# Patient Record
Sex: FEMALE | Race: NATIVE HAWAIIAN OR OTHER PACIFIC ISLANDER | NOT HISPANIC OR LATINO | Employment: FULL TIME | URBAN - METROPOLITAN AREA
[De-identification: names, ages, dates, MRNs, and addresses within clinical notes are randomized per-mention and may not be internally consistent; named-entity substitution may affect disease eponyms.]

---

## 2017-04-25 ENCOUNTER — APPOINTMENT (OUTPATIENT)
Dept: RADIOLOGY | Facility: MEDICAL CENTER | Age: 47
End: 2017-04-25
Attending: OBSTETRICS & GYNECOLOGY
Payer: COMMERCIAL

## 2017-07-02 ENCOUNTER — OFFICE VISIT (OUTPATIENT)
Dept: URGENT CARE | Facility: CLINIC | Age: 47
End: 2017-07-02
Payer: COMMERCIAL

## 2017-07-02 VITALS
RESPIRATION RATE: 18 BRPM | OXYGEN SATURATION: 98 % | BODY MASS INDEX: 28.24 KG/M2 | HEART RATE: 94 BPM | HEIGHT: 63 IN | SYSTOLIC BLOOD PRESSURE: 120 MMHG | DIASTOLIC BLOOD PRESSURE: 70 MMHG | WEIGHT: 159.4 LBS | TEMPERATURE: 98.1 F
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
## 2017-07-02 DIAGNOSIS — S86.811A STRAIN OF CALF MUSCLE, RIGHT, INITIAL ENCOUNTER: ICD-10-CM

## 2017-07-02 PROCEDURE — 99213 OFFICE O/P EST LOW 20 MIN: CPT | Performed by: PHYSICIAN ASSISTANT

## 2017-07-02 RX ORDER — HYDROCODONE BITARTRATE AND ACETAMINOPHEN 5; 325 MG/1; MG/1
1 TABLET ORAL EVERY 6 HOURS PRN
Qty: 14 TAB | Refills: 0 | Status: SHIPPED | OUTPATIENT
Start: 2017-07-02 | End: 2018-06-28

## 2017-07-02 ASSESSMENT — ENCOUNTER SYMPTOMS
NUMBNESS: 0
LOSS OF MOTION: 0
TINGLING: 0
MUSCLE WEAKNESS: 1
LOSS OF SENSATION: 0
MUSCULOSKELETAL NEGATIVE: 1
CONSTITUTIONAL NEGATIVE: 1
NEUROLOGICAL NEGATIVE: 1
INABILITY TO BEAR WEIGHT: 0

## 2017-07-02 NOTE — PROGRESS NOTES
"Subjective:      Shanae Burr is a 46 y.o. female who presents with Leg Injury            Leg Injury   The incident occurred 5 to 7 days ago (fall last wk; calf stain/inj). The incident occurred at home. The injury mechanism was a fall. The pain is present in the left leg. The quality of the pain is described as aching. The pain is moderate. The pain has been constant since onset. Associated symptoms include muscle weakness. Pertinent negatives include no inability to bear weight, loss of motion, loss of sensation, numbness or tingling. She reports no foreign bodies present. The symptoms are aggravated by movement, palpation and weight bearing. She has tried rest for the symptoms. The treatment provided mild relief.       Review of Systems   Constitutional: Negative.    Musculoskeletal: Negative.    Skin: Negative.    Neurological: Negative.  Negative for tingling and numbness.          Objective:     /70 mmHg  Pulse 94  Temp(Src) 36.7 °C (98.1 °F)  Resp 18  Ht 1.6 m (5' 3\")  Wt 72.303 kg (159 lb 6.4 oz)  BMI 28.24 kg/m2  SpO2 98%     Physical Exam   Constitutional: She is oriented to person, place, and time. She appears well-developed and well-nourished. No distress.   Musculoskeletal: Normal range of motion. She exhibits tenderness. She exhibits no edema.   Neurological: She is alert and oriented to person, place, and time. No sensory deficit. She exhibits abnormal muscle tone. Coordination and gait normal.   Skin: Skin is warm and dry. No erythema.   Psychiatric: She has a normal mood and affect. Her behavior is normal. Judgment and thought content normal.   Nursing note and vitals reviewed.    Filed Vitals:    07/02/17 1323   BP: 120/70   Pulse: 94   Temp: 36.7 °C (98.1 °F)   Resp: 18   Height: 1.6 m (5' 3\")   Weight: 72.303 kg (159 lb 6.4 oz)   SpO2: 98%     Active Ambulatory Problems     Diagnosis Date Noted   • No Active Ambulatory Problems     Resolved Ambulatory Problems     Diagnosis " Date Noted   • No Resolved Ambulatory Problems     No Additional Past Medical History     No current outpatient prescriptions on file prior to visit.     No current facility-administered medications on file prior to visit.     Gargles, Cepacol lozenges, Aleve/Advil as needed for throat pain  History reviewed. No pertinent family history.  Review of patient's allergies indicates no known allergies.              Assessment/Plan:     ·  fall x 1wk; L calf inj      · R/o surgical tear of upper calf/popliteal area; may need MRI [or U/S?]; will refer to Sports Med  · ACE applied

## 2017-07-02 NOTE — MR AVS SNAPSHOT
"        Shanae Burr   2017 1:15 PM   Office Visit   MRN: [id number]    Department:  [facility]   Dept Phone:  [phone]    Description:  Female : 1970   Provider:  Remy Perez PA-C           Reason for Visit     Leg Injury x1week, right leg injury after falling off ladder, landed on leg, calf pain, pain starting to travel up leg behind knee      Allergies as of 2017     No Known Allergies      You were diagnosed with     Fall, initial encounter   [[id number]]       Strain of calf muscle, right, initial encounter   [[id number]]         Vital Signs     Blood Pressure Pulse Temperature Respirations Height Weight    120/70 mmHg 94 36.7 °C (98.1 °F) 18 1.6 m (5' 3\") 72.303 kg (159 lb 6.4 oz)    Body Mass Index Oxygen Saturation                28.24 kg/m2 98%          Basic Information     Date Of Birth Sex Race Ethnicity Preferred Language    1970 Female ,  or other  Non- English      Your appointments     2017  1:00 PM   NEW TO YOU with Surjit Dempsey M.D.   [facility]    [address] [place] [id number] [phone]              Health Maintenance        Date Due Completion Dates    IMM DTaP/Tdap/Td Vaccine (1 - Tdap) 1990 ---    PAP SMEAR 1992 ---    MAMMOGRAM 2015, 2013, 2012, 2011    IMM INFLUENZA (1) 2017 ---            Current Immunizations     No immunizations on file.      Below and/or attached are the medications your provider expects you to take. Review all of your home medications and newly ordered medications with your provider and/or pharmacist. Follow medication instructions as directed by your provider and/or pharmacist. Please keep your medication list with you and share with your provider. Update the information when medications are discontinued, doses are changed, or new medications (including over-the-counter products) " are added; and carry medication information at all times in the event of emergency situations     Allergies:  No Known Allergies          Medications  Valid as of: July 02, 2017 -  1:42 PM    Generic Name Brand Name Tablet Size Instructions for use    Hydrocodone-Acetaminophen (Tab) NORCO 5-325 MG Take 1 Tab by mouth every 6 hours as needed.        NON SPECIFIED   Indications: birth control        .                 Medicines prescribed today were sent to:     [facility]Y #[id number] - [place] - [address] [place] [address]    Phone: [phone] Fax: [phone]    Open 24 Hours?: No      Medication refill instructions:       If your prescription bottle indicates you have medication refills left, it is not necessary to call your provider’s office. Please contact your pharmacy and they will refill your medication.    If your prescription bottle indicates you do not have any refills left, you may request refills at any time through one of the following ways: The online [employer] system (except Urgent Care), by calling your provider’s office, or by asking your pharmacy to contact your provider’s office with a refill request. Medication refills are processed only during regular business hours and may not be available until the next business day. Your provider may request additional information or to have a follow-up visit with you prior to refilling your medication.   *Please Note: Medication refills are assigned a new Rx number when refilled electronically. Your pharmacy may indicate that no refills were authorized even though a new prescription for the same medication is available at the pharmacy. Please request the medicine by name with the pharmacy before contacting your provider for a refill.        Referral     A referral request has been sent to our patient care coordination department. Please allow 3-5 business days for us to process this request and contact you either by phone or mail. If you do not  hear from us by the 5th business day, please call us at [phone].           [employer] Access Code: Activation code not generated  Current [employer] Status: Active

## 2017-07-03 ENCOUNTER — OFFICE VISIT (OUTPATIENT)
Dept: MEDICAL GROUP | Facility: CLINIC | Age: 47
End: 2017-07-03
Payer: COMMERCIAL

## 2017-07-03 VITALS
DIASTOLIC BLOOD PRESSURE: 80 MMHG | SYSTOLIC BLOOD PRESSURE: 114 MMHG | TEMPERATURE: 98.7 F | BODY MASS INDEX: 28.17 KG/M2 | OXYGEN SATURATION: 98 % | HEIGHT: 63 IN | WEIGHT: 159 LBS

## 2017-07-03 DIAGNOSIS — S86.111A GASTROCNEMIUS MUSCLE TEAR, RIGHT, INITIAL ENCOUNTER: ICD-10-CM

## 2017-07-03 PROCEDURE — 99203 OFFICE O/P NEW LOW 30 MIN: CPT | Performed by: FAMILY MEDICINE

## 2017-07-03 ASSESSMENT — ENCOUNTER SYMPTOMS
DIZZINESS: 0
SHORTNESS OF BREATH: 0
NAUSEA: 0
FEVER: 0
CHILLS: 0
VOMITING: 0

## 2017-07-03 NOTE — PROGRESS NOTES
"Subjective:      Shanae Burr is a 46 y.o. female who presents with Leg Injury      Referred by Remy Perez PA-C for evaluation of right leg pain    Leg Injury   Incident onset: DOI, Sunday, 6/25.   fell off step-stool and felt pop right calf muscle pain  Constant crampy  No radiation  Medial gastroc region  Some swelling for a few days  Improved with ice and ibuprofen  No prior injury to the leg    Review of Systems   Constitutional: Negative for fever and chills.   Respiratory: Negative for shortness of breath.    Cardiovascular: Negative for chest pain.   Gastrointestinal: Negative for nausea and vomiting.   Neurological: Negative for dizziness.     PMH:  has no past medical history of Breast cancer (CMS-[facility]).  MEDS:   Current outpatient prescriptions:   •  NON SPECIFIED, Indications: birth control, Disp: , Rfl:   •  hydrocodone-acetaminophen (NORCO) 5-325 MG Tab per tablet, Take 1 Tab by mouth every 6 hours as needed., Disp: 14 Tab, Rfl: 0  ALLERGIES: No Known Allergies  SURGHX: History reviewed. No pertinent past surgical history.  SOCHX:  reports that she has never smoked. She has never used smokeless tobacco. She reports that she drinks alcohol. She reports that she does not use illicit drugs.  FH: Family history was reviewed, no pertinent findings to report       Objective:     /80 mmHg  Temp(Src) 37.1 °C (98.7 °F)  Ht 1.6 m (5' 3\")  Wt 72.122 kg (159 lb)  BMI 28.17 kg/m2  SpO2 98%     Physical Exam   Constitutional: She appears well-developed. No distress.   Pulmonary/Chest: Effort normal.   Skin: Skin is warm and dry. She is not diaphoretic. No erythema.   Psychiatric: She has a normal mood and affect. Her behavior is normal.        lower extremities  Right lower extremity swelling compared to the left  POSITIVE medial head of the gastrocnemius on the right compared to the left  There is no tenderness at the lateral gastrocnemius bilaterally  15 cm from inferior patellar pole  Right 43 " CM   Left 42 CM  The legs are otherwise neurovascularly intact   There is POSITIVE pain with passive dorsiflexion of the foot on the right compared to the left with the knee in extension  There is NO pain with passive dorsiflexion of the foot bilaterally with the knee in flexion  Assessment/Plan:     1. Gastrocnemius muscle tear, right, initial encounter       Proximal muscle tear of the medial gastrocnemius on the right  Cam BOOT  F/u  7/14/17  Expect this to take 8-12 weeks to heal, in the meantime, activities as tolerated in the boot   She is still having pain in the boot she should decrease her activity level    Thank you Remy Perez PA-C for allowing me to participate in caring for the patient.

## 2017-07-03 NOTE — MR AVS SNAPSHOT
"Shanae Burr   7/3/2017 1:00 PM   Office Visit   MRN: [id number]    Department:  [facility]   Dept Phone:  [phone]    Description:  Female : 1970   Provider:  Surjit Dempsey M.D.           Reason for Visit     Leg Injury poss pulled muscle R side of calf, fell off ladder      Allergies as of 7/3/2017     No Known Allergies      You were diagnosed with     Gastrocnemius muscle tear, right, initial encounter   [[id number]]         Vital Signs     Blood Pressure Temperature Height Weight Body Mass Index Oxygen Saturation    114/80 mmHg 37.1 °C (98.7 °F) 1.6 m (5' 3\") 72.122 kg (159 lb) 28.17 kg/m2 98%    Smoking Status                   Never Smoker            Basic Information     Date Of Birth Sex Race Ethnicity Preferred Language    1970 Female ,  or other  Non- English      Your appointments     2017  1:00 PM   Established Patient with Surjit Dempsey M.D.   [facility]    [address] [facility] [id number] [phone]           You will be receiving a confirmation call a few days before your appointment from our automated call confirmation system.              Health Maintenance        Date Due Completion Dates    IMM DTaP/Tdap/Td Vaccine (1 - Tdap) 1990 ---    PAP SMEAR 1992 ---    MAMMOGRAM 2015, 2013, 2012, 2011    IMM INFLUENZA (1) 2017 ---            Current Immunizations     No immunizations on file.      Below and/or attached are the medications your provider expects you to take. Review all of your home medications and newly ordered medications with your provider and/or pharmacist. Follow medication instructions as directed by your provider and/or pharmacist. Please keep your medication list with you and share with your provider. Update the information when medications are discontinued, doses are changed, or new medications " (including over-the-counter products) are added; and carry medication information at all times in the event of emergency situations     Allergies:  No Known Allergies          Medications  Valid as of: July 03, 2017 -  2:01 PM    Generic Name Brand Name Tablet Size Instructions for use    Hydrocodone-Acetaminophen (Tab) NORCO 5-325 MG Take 1 Tab by mouth every 6 hours as needed.        NON SPECIFIED   Indications: birth control        .                 Medicines prescribed today were sent to:     [facility]/PHARMACY #[id number] - [place] - [address] [place] [address]    Phone: [phone] Fax: [phone]    Open 24 Hours?: No      Medication refill instructions:       If your prescription bottle indicates you have medication refills left, it is not necessary to call your provider’s office. Please contact your pharmacy and they will refill your medication.    If your prescription bottle indicates you do not have any refills left, you may request refills at any time through one of the following ways: The online [employer] system (except Urgent Care), by calling your provider’s office, or by asking your pharmacy to contact your provider’s office with a refill request. Medication refills are processed only during regular business hours and may not be available until the next business day. Your provider may request additional information or to have a follow-up visit with you prior to refilling your medication.   *Please Note: Medication refills are assigned a new Rx number when refilled electronically. Your pharmacy may indicate that no refills were authorized even though a new prescription for the same medication is available at the pharmacy. Please request the medicine by name with the pharmacy before contacting your provider for a refill.           [employer] Access Code: Activation code not generated  Current [employer] Status: Active

## 2017-07-14 ENCOUNTER — OFFICE VISIT (OUTPATIENT)
Dept: MEDICAL GROUP | Facility: CLINIC | Age: 47
End: 2017-07-14
Payer: COMMERCIAL

## 2017-07-14 VITALS
TEMPERATURE: 98.1 F | HEART RATE: 80 BPM | DIASTOLIC BLOOD PRESSURE: 76 MMHG | OXYGEN SATURATION: 97 % | SYSTOLIC BLOOD PRESSURE: 112 MMHG | RESPIRATION RATE: 18 BRPM | WEIGHT: 159 LBS | HEIGHT: 63 IN | BODY MASS INDEX: 28.17 KG/M2

## 2017-07-14 DIAGNOSIS — S86.111A GASTROCNEMIUS MUSCLE TEAR, RIGHT, INITIAL ENCOUNTER: ICD-10-CM

## 2017-07-14 PROCEDURE — 99213 OFFICE O/P EST LOW 20 MIN: CPT | Performed by: FAMILY MEDICINE

## 2017-07-14 NOTE — PROGRESS NOTES
"Subjective:      Shanae Burr is a 46 y.o. female who presents with Follow-Up      Referred by Remy Perez PA-C for evaluation of right leg pain  Incident onset: DOI, Sunday, 6/25.    Leg Injury   follow up for leg injury  fell off step-stool and felt pop right calf muscle pain  Improved, pain mostly with activity now  STOPPED using her cam walker 3-4 days ago (about 7/11/17)  No radiation  Medial gastroc region  No swelling   No prior leg issue    Review of Systems   Constitutional: Negative for fever and chills.   Respiratory: Negative for shortness of breath.    Cardiovascular: Negative for chest pain.   Gastrointestinal: Negative for nausea and vomiting.   Neurological: Negative for dizziness.     PMH:  has no past medical history of Breast cancer (CMS-[facility]).  MEDS:   Current outpatient prescriptions:   •  NON SPECIFIED, Indications: birth control, Disp: , Rfl:   •  hydrocodone-acetaminophen (NORCO) 5-325 MG Tab per tablet, Take 1 Tab by mouth every 6 hours as needed., Disp: 14 Tab, Rfl: 0  ALLERGIES: No Known Allergies  SURGHX: No past surgical history on file.  SOCHX:  reports that she has never smoked. She has never used smokeless tobacco. She reports that she drinks alcohol. She reports that she does not use illicit drugs.  FH: Family history was reviewed, no pertinent findings to report       Objective:     /76 mmHg  Pulse 80  Temp(Src) 36.7 °C (98.1 °F)  Resp 18  Ht 1.6 m (5' 3\")  Wt 72.122 kg (159 lb)  BMI 28.17 kg/m2  SpO2 97%     Physical Exam   Constitutional: She appears well-developed. No distress.   Pulmonary/Chest: Effort normal.   Skin: Skin is warm and dry. She is not diaphoretic. No erythema.   Psychiatric: She has a normal mood and affect. Her behavior is normal.        lower extremities  Right lower extremity swelling compared to the left  POSITIVE medial head of the gastrocnemius on the right compared to the left  There is no tenderness at the lateral gastrocnemius " bilaterally  The legs are otherwise neurovascularly intact   There is POSITIVE pain with passive dorsiflexion of the foot on the right compared to the left with the knee in extension  There is NO pain with passive dorsiflexion of the foot bilaterally with the knee in flexion  Assessment/Plan:     1. Gastrocnemius muscle tear, right, initial encounter        Proximal muscle tear of the medial gastrocnemius on the right  Stopped using Cam BOOT despite recommendation  Cleared for swimming without kicking of wall    Return in about 2 weeks (around 7/28/2017).    Expect this to take 8-12 weeks to heal  If she is having pain she should decrease her activity level    Thank you Remy Perez PA-C for allowing me to participate in caring for the patient.

## 2017-07-14 NOTE — MR AVS SNAPSHOT
"Shanae Burr   2017 1:00 PM   Office Visit   MRN: [id number]    Department:  [facility]   Dept Phone:  [phone]    Description:  Female : 1970   Provider:  Surjit Dempsey M.D.           Reason for Visit     Follow-Up F/U R side muscle tear       Allergies as of 2017     No Known Allergies      You were diagnosed with     Gastrocnemius muscle tear, right, initial encounter   [[id number]]         Vital Signs     Blood Pressure Pulse Temperature Respirations Height Weight    112/76 mmHg 80 36.7 °C (98.1 °F) 18 1.6 m (5' 3\") 72.122 kg (159 lb)    Body Mass Index Oxygen Saturation Smoking Status             28.17 kg/m2 97% Never Smoker          Basic Information     Date Of Birth Sex Race Ethnicity Preferred Language    1970 Female ,  or other  Non- English      Health Maintenance        Date Due Completion Dates    IMM DTaP/Tdap/Td Vaccine (1 - Tdap) 1990 ---    PAP SMEAR 1992 ---    MAMMOGRAM 2015, 2013, 2012, 2011    IMM INFLUENZA (1) 2017 ---            Current Immunizations     No immunizations on file.      Below and/or attached are the medications your provider expects you to take. Review all of your home medications and newly ordered medications with your provider and/or pharmacist. Follow medication instructions as directed by your provider and/or pharmacist. Please keep your medication list with you and share with your provider. Update the information when medications are discontinued, doses are changed, or new medications (including over-the-counter products) are added; and carry medication information at all times in the event of emergency situations     Allergies:  No Known Allergies          Medications  Valid as of: 2017 -  1:29 PM    Generic Name Brand Name Tablet Size Instructions for use    Hydrocodone-Acetaminophen (Tab) NORCO 5-325 MG Take 1 Tab by mouth " every 6 hours as needed.        NON SPECIFIED   Indications: birth control        .                 Medicines prescribed today were sent to:     [facility]/PHARMACY #[id number] - [place] - [address] [place] [address]    Phone: [phone] Fax: [phone]    Open 24 Hours?: No      Medication refill instructions:       If your prescription bottle indicates you have medication refills left, it is not necessary to call your provider’s office. Please contact your pharmacy and they will refill your medication.    If your prescription bottle indicates you do not have any refills left, you may request refills at any time through one of the following ways: The online [employer] system (except Urgent Care), by calling your provider’s office, or by asking your pharmacy to contact your provider’s office with a refill request. Medication refills are processed only during regular business hours and may not be available until the next business day. Your provider may request additional information or to have a follow-up visit with you prior to refilling your medication.   *Please Note: Medication refills are assigned a new Rx number when refilled electronically. Your pharmacy may indicate that no refills were authorized even though a new prescription for the same medication is available at the pharmacy. Please request the medicine by name with the pharmacy before contacting your provider for a refill.           [employer] Access Code: Activation code not generated  Current [employer] Status: Active

## 2017-12-22 ENCOUNTER — HOSPITAL ENCOUNTER (OUTPATIENT)
Dept: RADIOLOGY | Facility: MEDICAL CENTER | Age: 47
End: 2017-12-22
Attending: OBSTETRICS & GYNECOLOGY
Payer: COMMERCIAL

## 2017-12-22 DIAGNOSIS — Z13.9 SCREENING: ICD-10-CM

## 2017-12-22 PROCEDURE — G0202 SCR MAMMO BI INCL CAD: HCPCS

## 2018-06-28 ENCOUNTER — OFFICE VISIT (OUTPATIENT)
Dept: MEDICAL GROUP | Facility: PHYSICIAN GROUP | Age: 48
End: 2018-06-28
Payer: COMMERCIAL

## 2018-06-28 VITALS
SYSTOLIC BLOOD PRESSURE: 108 MMHG | HEIGHT: 63 IN | DIASTOLIC BLOOD PRESSURE: 62 MMHG | BODY MASS INDEX: 30.72 KG/M2 | WEIGHT: 173.4 LBS | OXYGEN SATURATION: 97 % | RESPIRATION RATE: 16 BRPM | TEMPERATURE: 98 F | HEART RATE: 64 BPM

## 2018-06-28 DIAGNOSIS — E66.9 OBESITY (BMI 30-39.9): ICD-10-CM

## 2018-06-28 DIAGNOSIS — Z23 NEED FOR VACCINATION: ICD-10-CM

## 2018-06-28 DIAGNOSIS — M77.12 LATERAL EPICONDYLITIS OF LEFT ELBOW: ICD-10-CM

## 2018-06-28 DIAGNOSIS — G43.009 MIGRAINE WITHOUT AURA AND WITHOUT STATUS MIGRAINOSUS, NOT INTRACTABLE: ICD-10-CM

## 2018-06-28 PROBLEM — M77.9 TENDONITIS: Status: ACTIVE | Noted: 2018-06-28

## 2018-06-28 PROCEDURE — 90471 IMMUNIZATION ADMIN: CPT | Performed by: PHYSICIAN ASSISTANT

## 2018-06-28 PROCEDURE — 90715 TDAP VACCINE 7 YRS/> IM: CPT | Performed by: PHYSICIAN ASSISTANT

## 2018-06-28 PROCEDURE — 99214 OFFICE O/P EST MOD 30 MIN: CPT | Mod: 25 | Performed by: PHYSICIAN ASSISTANT

## 2018-06-28 RX ORDER — SUMATRIPTAN 100 MG/1
TABLET, FILM COATED ORAL
Refills: 11 | COMMUNITY
Start: 2018-06-04 | End: 2023-11-10 | Stop reason: SDUPTHER

## 2018-06-28 RX ORDER — LEVONORGESTREL AND ETHINYL ESTRADIOL 100-20(84)
1 KIT ORAL DAILY
Refills: 4 | COMMUNITY
Start: 2018-06-04 | End: 2022-09-06

## 2018-06-28 RX ORDER — MELOXICAM 7.5 MG/1
7.5 TABLET ORAL DAILY
Qty: 30 TAB | Refills: 0 | Status: SHIPPED | OUTPATIENT
Start: 2018-06-28 | End: 2018-07-26 | Stop reason: SDUPTHER

## 2018-06-28 ASSESSMENT — PATIENT HEALTH QUESTIONNAIRE - PHQ9: CLINICAL INTERPRETATION OF PHQ2 SCORE: 0

## 2018-06-28 ASSESSMENT — PAIN SCALES - GENERAL: PAINLEVEL: NO PAIN

## 2018-06-28 NOTE — ASSESSMENT & PLAN NOTE
Chronic but stable condition. Patient states she was diagnosed with migraines 20 years ago. States she is currently prescribed Imitrex 100 mg tablet and advised to take one tablet onset of headache symptoms. Repeat dose 2 hours later if symptoms have not subsided. It does not exceed more than 200 mg in a 24-hour span. States she is compliant with medication and experiences no side effects or complications from medication. States she also takes over-the-counter ibuprofen with mild alleviation of symptoms. She mentions that he is a trigger for migraines. Denies photosensitivity, photophobia, nausea or vomiting.

## 2018-06-28 NOTE — PROGRESS NOTES
Chief Complaint   Patient presents with   • Establish Care     LT arm px, pt states px is not due to injury       HISTORY OF PRESENT ILLNESS: Shanae Burr is an established 47 y.o. female here to discuss the evaluation and management of:    Obesity (BMI 30-39.9)  Pt. Admits diet could improve. States exercise routine consist of cardiovascular and strength training 3-4 times a week ~2 hours per time.     Lateral epicondylitis of left elbow  Acute. Patient states for the past one month lateral epicondyle has been tender to deep palpation. States she's also been experiencing pain of lateral forearm and lateral upper arm. Pain is an intermittent radiating low-grade aching pain. Lifting heavier objects aggravate symptoms. Rest alleviates symptoms. States she has been taking over-the-counter ibuprofen with temporary mild alleviation of symptoms. States she has also been using icy hot with mild alleviation of symptoms. Denies muscle atrophy or weakness.      Migraine without aura and without status migrainosus, not intractable  Chronic but stable condition. Patient states she was diagnosed with migraines 20 years ago. States she is currently prescribed Imitrex 100 mg tablet and advised to take one tablet onset of headache symptoms. Repeat dose 2 hours later if symptoms have not subsided. It does not exceed more than 200 mg in a 24-hour span. States she is compliant with medication and experiences no side effects or complications from medication. States she also takes over-the-counter ibuprofen with mild alleviation of symptoms. She mentions that he is a trigger for migraines. Denies photosensitivity, photophobia, nausea or vomiting.        Patient Active Problem List    Diagnosis Date Noted   • Obesity (BMI 30-39.9) 06/28/2018   • Lateral epicondylitis of left elbow 06/28/2018   • Migraine without aura and without status migrainosus, not intractable 06/28/2018       Allergies:Patient has no known  allergies.    Current Outpatient Prescriptions   Medication Sig Dispense Refill   • meloxicam (MOBIC) 7.5 MG Tab Take 1 Tab by mouth every day. 30 Tab 0   • Levonorgest-Eth Estrad -Day 0.1-0.02 & 0.01 MG Tab Take 1 tablet by mouth every day. TAKE 1 TABLET BY MOUTH DAILY  4   • sumatriptan (IMITREX) 100 MG tablet TAKE 1 TABLET BY MOUTH AT ONSET OF HEADACHE, THEN TAKE AS NEEDED. MAX 1 TAB PER 24 HOURS  11     No current facility-administered medications for this visit.        Social History   Substance Use Topics   • Smoking status: Never Smoker   • Smokeless tobacco: Never Used   • Alcohol use Yes      Comment: Rarely. No Binge-Drinking.         Family Status   Relation Status   • Mother Alive   • Father Alive   • Sister Alive   • Brother Alive   • Maternal Grandmother    • Maternal Grandfather    • Paternal Grandmother    • Paternal Grandfather    • Daughter Alive   • Daughter Alive     Family History   Problem Relation Age of Onset   • No Known Problems Mother    • No Known Problems Father    • Diabetes Maternal Grandfather      Type II   • Heart Disease Maternal Grandfather    • No Known Problems Daughter    • No Known Problems Daughter        ROS:  Review of Systems   Constitutional: Negative for fever, chills, weight loss and malaise/fatigue.   HENT: Negative for ear pain, nosebleeds, congestion, sore throat and neck pain.    Eyes: Negative for blurred vision.   Respiratory: Negative for cough, sputum production, shortness of breath and wheezing.    Cardiovascular: Negative for chest pain, palpitations, orthopnea and leg swelling.   Gastrointestinal: Negative for heartburn, nausea, vomiting and abdominal pain.   Genitourinary: Negative for dysuria, urgency and frequency.   Musculoskeletal: Negative for myalgias, back pain and joint pain.  Positive for left arm pain.  Skin: Negative for rash and itching.   Neurological: Negative for dizziness, tingling, tremors, sensory change,  "focal weakness and headaches.   Endo/Heme/Allergies: Does not bruise/bleed easily.   Psychiatric/Behavioral: Negative for depression, suicidal ideas and memory loss.  The patient is not nervous/anxious and does not have insomnia.    All other systems reviewed and are negative except as in HPI.    Exam: Blood pressure 108/62, pulse 64, temperature 36.7 °C (98 °F), resp. rate 16, height 1.6 m (5' 3\"), weight 78.7 kg (173 lb 6.4 oz), SpO2 97 %, not currently breastfeeding. Body mass index is 30.72 kg/m².  General: Normal appearing. No distress.  HEENT: Normocephalic. Eyes conjunctiva clear lids without ptosis, pupils equal and reactive to light accommodation, ears normal shape and contour, canals are clear bilaterally, tympanic membranes are benign, nasal mucosa benign, oropharynx is without erythema, edema or exudates.   Neck: Supple without JVD or bruit. Thyroid is not enlarged.  Pulmonary: Clear to ausculation.  Normal effort. No rales, ronchi, or wheezing.  Cardiovascular: Regular rate and rhythm without murmur. Carotid pulses are intact and equal bilaterally.  Abdomen: Soft, nontender, nondistended. Normal bowel sounds. Liver and spleen are not palpable  Neurologic: Grossly nonfocal.  Cranial nerves are normal. LE TR's normal and symmetric.  Lymph: No cervical, supraclavicular or axillary lymph nodes are palpable  Skin: Warm and dry.  No rashes or suspicious skin lesions.  Musculoskeletal: Normal gait. No extremity cyanosis, clubbing, or edema. Positive for  tenderness in the left lateral epicondyle and proximal wrist extensor muscles. Positive for pain with resisted wrist extension with elbow in full extension. Positive for pain with wrist flexion with elbow in full extension. Positive for left decreased  strength. 4/5. Negative for muscle atrophy.  Psych: Normal mood and affect. Alert and oriented x3. Judgment and insight is normal.    Medical decision-making and discussion:  1. Obesity (BMI 30-39.9)  - " Encouraged diet high in fruits, vegetables, and fiber. And a diet low in salt, refined carbohydrates, cholesterol, saturated fat, and trans fatty acids.    - Encouraged  a minimum of 30 minutes of moderate intensity aerobic exercise (eg, brisk walking) is recommended on five days each week. Or 20 minutes of vigorous-intensity aerobic exercise (eg, jogging) on three days each week.     - Patient identified as having weight management issue.  Appropriate orders and counseling given.    2. Lateral epicondylitis of left elbow  Advised patient to rest, stretch, use anti-inflammatories as prescribed, suggested heating pad or icing as needed. Also suggested elbow brace and over-the-counter Arinca Gel.  Patient has been prescribed Mobic 7.5 mg tablets take one tablet by mouth every day with food. Discussed common side effects and adverse reactions of medication with patient. Advised patient if she develops gastrointestinal symptoms to discontinue medication.     Symptoms are not improving discussed referral to sports medicine for further evaluation symptoms.    Follow-up for worsening symptoms,lack of expected recovery, or should new symptoms or problems arise.      - meloxicam (MOBIC) 7.5 MG Tab; Take 1 Tab by mouth every day.  Dispense: 30 Tab; Refill: 0    3. Migraine without aura and without status migrainosus, not intractable  Chronic stable condition. Continue current medication regimen. Will continue to monitor.    4. Need for vaccination  A TDAP vaccination was administered to patient without complication. A VIS form was provided to patient.     - Tdap =>8yo IM      Please note that this dictation was created using voice recognition software. I have made every reasonable attempt to correct obvious errors, but I expect that there are errors of grammar and possibly content that I did not discover before finalizing the note.        Return if symptoms worsen or fail to improve.

## 2018-06-28 NOTE — LETTER
[facility]  Jayda Galan P.A.-C.  [address]  [place] [address]  Fax: [phone]   Authorization for Release/Disclosure of   Protected Health Information   Name: SHANAE BHATT : 1970 SSN: [id number]   Address: [address] [address]  [place] [address] Phone:    [phone] (home)    I authorize the entity listed below to release/disclose the PHI below to:   [facility]/Jayda Galan P.A.-C. and Jayda Galan P.A.-C.   Provider or Entity Name:  [facility]   Address   City, State, [facility]   Phone:      Fax:     Reason for request: continuity of care   Information to be released:    [  ] LAST COLONOSCOPY,  including any PATH REPORT and follow-up  [  ] LAST FIT/COLOGUARD RESULT [  ] LAST DEXA  [  ] LAST MAMMOGRAM  [ x ] LAST PAP  [  ] LAST LABS [  ] RETINA EXAM REPORT  [  ] IMMUNIZATION RECORDS  [  ] Release all info      [  ] Check here and initial the line next to each item to release ALL health information INCLUDING  _____ Care and treatment for drug and / or alcohol abuse  _____ HIV testing, infection status, or AIDS  _____ Genetic Testing    DATES OF SERVICE OR TIME PERIOD TO BE DISCLOSED: _____________  I understand and acknowledge that:  * This Authorization may be revoked at any time by you in writing, except if your health information has already been used or disclosed.  * Your health information that will be used or disclosed as a result of you signing this authorization could be re-disclosed by the recipient. If this occurs, your re-disclosed health information may no longer be protected by State or Federal laws.  * You may refuse to sign this Authorization. Your refusal will not affect your ability to obtain treatment.  * This Authorization becomes effective upon signing and will  on (date) __________.      If no date is indicated, this Authorization will  one (1) year from the signature date.    Name: Shanae Bhatt    Signature:   Date:     2018       PLEASE  FAX REQUESTED RECORDS BACK TO: [phone]

## 2018-06-28 NOTE — ASSESSMENT & PLAN NOTE
Pt. Admits diet could improve. States exercise routine consist of cardiovascular and strength training 3-4 times a week ~2 hours per time.

## 2018-06-28 NOTE — ASSESSMENT & PLAN NOTE
Acute. Patient states for the past one month lateral epicondyle has been tender to deep palpation. States she's also been experiencing pain of lateral forearm and lateral upper arm. Pain is an intermittent radiating low-grade aching pain. Lifting heavier objects aggravate symptoms. Rest alleviates symptoms. States she has been taking over-the-counter ibuprofen with temporary mild alleviation of symptoms. States she has also been using icy hot with mild alleviation of symptoms. Denies muscle atrophy or weakness.

## 2018-07-26 DIAGNOSIS — M77.12 LATERAL EPICONDYLITIS OF LEFT ELBOW: ICD-10-CM

## 2018-07-26 RX ORDER — MELOXICAM 7.5 MG/1
TABLET ORAL
Qty: 30 TAB | Refills: 0 | Status: SHIPPED | OUTPATIENT
Start: 2018-07-26 | End: 2018-08-24 | Stop reason: SDUPTHER

## 2018-08-24 DIAGNOSIS — M77.12 LATERAL EPICONDYLITIS OF LEFT ELBOW: ICD-10-CM

## 2018-08-24 RX ORDER — MELOXICAM 7.5 MG/1
TABLET ORAL
Qty: 30 TAB | Refills: 0 | Status: SHIPPED | OUTPATIENT
Start: 2018-08-24 | End: 2021-01-14

## 2018-11-05 DIAGNOSIS — Z00.00 WELLNESS EXAMINATION: ICD-10-CM

## 2018-12-15 LAB
ALBUMIN SERPL-MCNC: 3.7 G/DL (ref 3.5–5.5)
ALBUMIN/GLOB SERPL: 1 {RATIO} (ref 1.2–2.2)
ALP SERPL-CCNC: 57 IU/L (ref 39–117)
ALT SERPL-CCNC: 17 IU/L (ref 0–32)
AST SERPL-CCNC: 18 IU/L (ref 0–40)
BILIRUB SERPL-MCNC: 0.3 MG/DL (ref 0–1.2)
BUN SERPL-MCNC: 12 MG/DL (ref 6–24)
BUN/CREAT SERPL: 13 (ref 9–23)
CALCIUM SERPL-MCNC: 9 MG/DL (ref 8.7–10.2)
CHLORIDE SERPL-SCNC: 101 MMOL/L (ref 96–106)
CHOLEST SERPL-MCNC: 149 MG/DL (ref 100–199)
CO2 SERPL-SCNC: 21 MMOL/L (ref 20–29)
CREAT SERPL-MCNC: 0.93 MG/DL (ref 0.57–1)
GLOBULIN SER CALC-MCNC: 3.6 G/DL (ref 1.5–4.5)
GLUCOSE SERPL-MCNC: 87 MG/DL (ref 65–99)
HDLC SERPL-MCNC: 48 MG/DL
IF AFRICAN AMERICAN  100797: 85 ML/MIN/1.73
IF NON AFRICAN AMER 100791: 73 ML/MIN/1.73
LABORATORY COMMENT REPORT: NORMAL
LDLC SERPL CALC-MCNC: 87 MG/DL (ref 0–99)
POTASSIUM SERPL-SCNC: 4.6 MMOL/L (ref 3.5–5.2)
PROT SERPL-MCNC: 7.3 G/DL (ref 6–8.5)
SODIUM SERPL-SCNC: 136 MMOL/L (ref 134–144)
TRIGL SERPL-MCNC: 68 MG/DL (ref 0–149)
VLDLC SERPL CALC-MCNC: 14 MG/DL (ref 5–40)

## 2020-08-14 ENCOUNTER — HOSPITAL ENCOUNTER (OUTPATIENT)
Dept: RADIOLOGY | Facility: MEDICAL CENTER | Age: 50
End: 2020-08-14
Attending: PHYSICIAN ASSISTANT
Payer: COMMERCIAL

## 2020-08-14 DIAGNOSIS — Z12.31 VISIT FOR SCREENING MAMMOGRAM: ICD-10-CM

## 2020-08-14 PROCEDURE — 77067 SCR MAMMO BI INCL CAD: CPT

## 2021-01-08 ENCOUNTER — APPOINTMENT (OUTPATIENT)
Dept: MEDICAL GROUP | Facility: PHYSICIAN GROUP | Age: 51
End: 2021-01-08
Payer: COMMERCIAL

## 2021-01-14 ENCOUNTER — OFFICE VISIT (OUTPATIENT)
Dept: MEDICAL GROUP | Facility: PHYSICIAN GROUP | Age: 51
End: 2021-01-14
Payer: COMMERCIAL

## 2021-01-14 VITALS
OXYGEN SATURATION: 98 % | HEIGHT: 64 IN | DIASTOLIC BLOOD PRESSURE: 60 MMHG | SYSTOLIC BLOOD PRESSURE: 120 MMHG | TEMPERATURE: 97.8 F | BODY MASS INDEX: 32.27 KG/M2 | WEIGHT: 189 LBS | HEART RATE: 76 BPM | RESPIRATION RATE: 15 BRPM

## 2021-01-14 DIAGNOSIS — M54.2 POSTERIOR NECK PAIN: ICD-10-CM

## 2021-01-14 DIAGNOSIS — E66.9 OBESITY (BMI 30-39.9): ICD-10-CM

## 2021-01-14 PROCEDURE — 99213 OFFICE O/P EST LOW 20 MIN: CPT | Performed by: PHYSICIAN ASSISTANT

## 2021-01-14 ASSESSMENT — PATIENT HEALTH QUESTIONNAIRE - PHQ9: CLINICAL INTERPRETATION OF PHQ2 SCORE: 0

## 2021-01-14 NOTE — PROGRESS NOTES
Chief Complaint   Patient presents with   • Annual Exam       HISTORY OF PRESENT ILLNESS: Shanae Burr is an established 50 y.o. female here to discuss the evaluation and management of:    Patient is a pleasant 50-year-old female here today to discuss weight.  She is requesting to be referred to the weight loss program at [facility] located in the department of nutrition.  Referral will be placed.  Patient states since the New Year's she has been trying to eat healthier.  States she does snack more than she should on unhealthy food items.  States she also eats more sweets than she should and after dinner often eating sweets such as ice cream.  States she has a sporadic workout routine.  Patient is hard for her to do certain exercises because she experiences posterior neck pain when lifting weights or doing upper body exercises.  States when she experiences posterior neck pain she will experience headaches.  States usually she takes Advil prior to these types of workout it helps alleviate headache symptoms.  Describes neck pain as a aching pain that does not radiate.  She tells me that she is prescribed Imitrex for migraines.  Patient denies injury to neck or been in the .      Patient Active Problem List    Diagnosis Date Noted   • Obesity (BMI 30-39.9) 06/28/2018   • Lateral epicondylitis of left elbow 06/28/2018   • Migraine without aura and without status migrainosus, not intractable 06/28/2018       Allergies:Patient has no known allergies.    Current Outpatient Medications   Medication Sig Dispense Refill   • Levonorgest-Eth Estrad 91-Day 0.1-0.02 & 0.01 MG Tab Take 1 tablet by mouth every day. TAKE 1 TABLET BY MOUTH DAILY  4   • sumatriptan (IMITREX) 100 MG tablet TAKE 1 TABLET BY MOUTH AT ONSET OF HEADACHE, THEN TAKE AS NEEDED. MAX 1 TAB PER 24 HOURS  11     No current facility-administered medications for this visit.        Social History     Tobacco Use   • Smoking status: Never Smoker   •  "Smokeless tobacco: Never Used   Substance Use Topics   • Alcohol use: Yes     Comment: Socially. No Binge-Drinking.     • Drug use: No       Family Status   Relation Name Status   • Mo  Alive   • Fa  Alive   • Sis Half x 2 Alive   • Bro Half Alive   • MGMo     • MGFa     • PGMo     • PGFa     • Dick  Alive   • Dick  Alive     Family History   Problem Relation Age of Onset   • No Known Problems Mother    • No Known Problems Father    • Diabetes Maternal Grandfather         Type II   • Heart Disease Maternal Grandfather    • No Known Problems Daughter    • No Known Problems Daughter        ROS:  Review of Systems   Constitutional: Negative for fever, chills, weight loss and malaise/fatigue.   HENT: Negative for ear pain, nosebleeds, congestion, sore throat and neck pain.    Eyes: Negative for blurred vision.   Respiratory: Negative for cough, sputum production, shortness of breath and wheezing.    Cardiovascular: Negative for chest pain, palpitations, orthopnea and leg swelling.   Gastrointestinal: Negative for heartburn, nausea, vomiting and abdominal pain.   Genitourinary: Negative for dysuria, urgency and frequency.   Musculoskeletal: Negative for myalgias, back pain and joint pain.   Skin: Negative for rash and itching.   Neurological: Negative for dizziness, tingling, tremors, sensory change, focal weakness and headaches.   Endo/Heme/Allergies: Does not bruise/bleed easily.   Psychiatric/Behavioral: Negative for depression, suicidal ideas and memory loss.  The patient is not nervous/anxious and does not have insomnia.    All other systems reviewed and are negative except as in HPI.    Exam: /60 (BP Location: Left arm, Patient Position: Sitting, BP Cuff Size: Adult)   Pulse 76   Temp 36.6 °C (97.8 °F) (Temporal)   Resp 15   Ht 1.626 m (5' 4\")   Wt 85.7 kg (189 lb)   SpO2 98%  Body mass index is 32.44 kg/m².  General: Normal appearing. No distress.  HEENT: Normocephalic. " Eyes conjunctiva clear lids without ptosis, ears normal shape and contour.  Neck: Supple without JVD. Thyroid is not enlarged.  Pulmonary: Clear to ausculation.  Normal effort. No rales, ronchi, or wheezing.  Cardiovascular: Regular rate and rhythm without murmur.   Abdomen: Nondistended.   Neurologic: Grossly nonfocal.  Cranial nerves are normal.  Skin: Warm and dry.  No rashes or suspicious skin lesions.  Musculoskeletal: Normal gait. No extremity cyanosis, clubbing, or edema.  Psych: Normal mood and affect. Alert and oriented x3. Judgment and insight is normal.    Medical decision-making and discussion:    1. Obesity (BMI 30-39.9)  - Encouraged diet high in fruits, vegetables, and fiber. And a diet low in salt, refined carbohydrates, cholesterol, saturated fat, and trans fatty acids.    - Encouraged  a minimum of 30 minutes of moderate intensity aerobic exercise (eg, brisk walking) is recommended on five days each week. Or 20 minutes of vigorous-intensity aerobic exercise (eg, jogging) on three days each week.     Suggested patient download document spell and keep track of caloric intake. Emphasized the importance of a  regular exercise routine, practicing good sleep hygiene, staying hydrated.  Patient has been referred to [facility] weight loss program.    Patient declined lab work at this time.    - REFERRAL TO NUTRITION SERVICES    2. Posterior neck pain  Chronic problem.  Discussed x-rays with patient.  Patient declined at this time.  Advised patient contact me if she decides to proceed with x-rays.  Patient read plan.  Continue conservative treatment options.  Medication use proper body mechanics.  Suggested ice and heat as needed.  Continue stretching and massaging.  Use over-the-counter analgesics as needed.        Please note that this dictation was created using voice recognition software. I have made every reasonable attempt to correct obvious errors, but I expect that there are errors of grammar and possibly  content that I did not discover before finalizing the note.    Assessment/Plan:  1. Obesity (BMI 30-39.9)  REFERRAL TO NUTRITION SERVICES   2. Posterior neck pain         Return if symptoms worsen or fail to improve.

## 2021-01-31 ENCOUNTER — OFFICE VISIT (OUTPATIENT)
Dept: URGENT CARE | Facility: CLINIC | Age: 51
End: 2021-01-31
Payer: COMMERCIAL

## 2021-01-31 VITALS
DIASTOLIC BLOOD PRESSURE: 88 MMHG | HEIGHT: 64 IN | SYSTOLIC BLOOD PRESSURE: 126 MMHG | BODY MASS INDEX: 31.58 KG/M2 | OXYGEN SATURATION: 98 % | RESPIRATION RATE: 18 BRPM | HEART RATE: 76 BPM | TEMPERATURE: 97.6 F | WEIGHT: 185 LBS

## 2021-01-31 DIAGNOSIS — R11.2 NAUSEA AND VOMITING, INTRACTABILITY OF VOMITING NOT SPECIFIED, UNSPECIFIED VOMITING TYPE: ICD-10-CM

## 2021-01-31 DIAGNOSIS — R42 VERTIGO: ICD-10-CM

## 2021-01-31 PROCEDURE — 99213 OFFICE O/P EST LOW 20 MIN: CPT | Performed by: NURSE PRACTITIONER

## 2021-01-31 RX ORDER — ONDANSETRON 4 MG/1
4 TABLET, ORALLY DISINTEGRATING ORAL ONCE
Status: COMPLETED | OUTPATIENT
Start: 2021-01-31 | End: 2021-01-31

## 2021-01-31 RX ORDER — MECLIZINE HYDROCHLORIDE 25 MG/1
25 TABLET ORAL EVERY 6 HOURS PRN
Qty: 30 TAB | Refills: 0 | Status: SHIPPED | OUTPATIENT
Start: 2021-01-31 | End: 2021-04-06

## 2021-01-31 RX ORDER — ONDANSETRON 4 MG/1
4 TABLET, ORALLY DISINTEGRATING ORAL EVERY 6 HOURS PRN
Qty: 10 TAB | Refills: 0 | Status: SHIPPED | OUTPATIENT
Start: 2021-01-31 | End: 2021-04-06

## 2021-01-31 RX ADMIN — ONDANSETRON 4 MG: 4 TABLET, ORALLY DISINTEGRATING ORAL at 11:02

## 2021-01-31 ASSESSMENT — ENCOUNTER SYMPTOMS
DIZZINESS: 1
SENSORY CHANGE: 0
WEAKNESS: 0
VOMITING: 1
FEVER: 0
VISUAL CHANGE: 0
SORE THROAT: 0
CHILLS: 0
NAUSEA: 1
COUGH: 0
BLURRED VISION: 0
TINGLING: 0
HEADACHES: 0
SINUS PAIN: 0
CONSTITUTIONAL NEGATIVE: 1

## 2021-01-31 ASSESSMENT — VISUAL ACUITY: OU: 1

## 2021-01-31 NOTE — PATIENT INSTRUCTIONS
A referral request has been placed for ENT (ear, nose, throat). We have a referrals department that is tasked with locating a suitable office that currently accepts patients and your insurance and you should be receiving referral information from them such as the office name, address, and number. This process usually takes around 3 business days. If you are not contacted by our referrals department, please call [phone].     Vertigo  Vertigo is the feeling that you or your surroundings are moving when they are not. This feeling can come and go at any time. Vertigo often goes away on its own. Vertigo can be dangerous if it occurs while you are doing something that could endanger you or others, such as driving or operating machinery.  Your health care provider will do tests to determine the cause of your vertigo. Tests will also help your health care provider decide how best to treat your condition.  Follow these instructions at home:  Eating and drinking         · Drink enough fluid to keep your urine pale yellow.  · Do not drink alcohol.  Activity  · Return to your normal activities as told by your health care provider. Ask your health care provider what activities are safe for you.  · In the morning, first sit up on the side of the bed. When you feel okay, stand slowly while you hold onto something until you know that your balance is fine.  · Move slowly. Avoid sudden body or head movements or certain positions, as told by your health care provider.  · If you have trouble walking or keeping your balance, try using a cane for stability. If you feel dizzy or unstable, sit down right away.  · Avoid doing any tasks that would cause danger to you or others if vertigo occurs.  · Avoid bending down if you feel dizzy. Place items in your home so that they are easy for you to reach without leaning over.  · Do not drive or use heavy machinery if you feel dizzy.  General instructions  · Take over-the-counter and  prescription medicines only as told by your health care provider.  · Keep all follow-up visits as told by your health care provider. This is important.  Contact a health care provider if:  · Your medicines do not relieve your vertigo or they make it worse.  · You have a fever.  · Your condition gets worse or you develop new symptoms.  · Your family or friends notice any behavioral changes.  · Your nausea or vomiting gets worse.  · You have numbness or a prickling and tingling sensation in part of your body.  Get help right away if you:  · Have difficulty moving or speaking.  · Are always dizzy.  · Faint.  · Develop severe headaches.  · Have weakness in your hands, arms, or legs.  · Have changes in your hearing or vision.  · Develop a stiff neck.  · Develop sensitivity to light.  Summary  · Vertigo is the feeling that you or your surroundings are moving when they are not.  · Your health care provider will do tests to determine the cause of your vertigo.  · Follow instructions for home care. You may be told to avoid certain tasks, positions, or movements.  · Contact a health care provider if your medicines do not relieve your symptoms, or if you have a fever, nausea, vomiting, or changes in behavior.  · Get help right away if you have severe headaches or difficulty speaking, or you develop hearing or vision problems.  This information is not intended to replace advice given to you by your health care provider. Make sure you discuss any questions you have with your health care provider.  Document Released: 09/27/2006 Document Revised: 11/11/2019 Document Reviewed: 11/11/2019  [employer] Patient Education © 2020 [employer]

## 2021-01-31 NOTE — PROGRESS NOTES
Subjective:     Shanae Burr is a 50 y.o. female who presents for Dizziness (with vomitting symptoms started this morning at 4 am )       Dizziness  This is a new problem. The problem has been gradually worsening. Associated symptoms include nausea and vomiting. Pertinent negatives include no chills, congestion, coughing, fever, headaches, sore throat, visual change or weakness.     Patient reports that this morning she woke up with severe dizziness.  Reports a spinning sensation which worsens when lying down and turning her head towards the left side.  Reports nausea and vomiting.  Denies history of dizziness or vertigo.    Patient was screened prior to rooming and denied COVID-19 diagnosis or contact with a person who has been diagnosed or is suspected to have COVID-19. During this visit, appropriate PPE was worn, hand hygiene was performed, and the patient and any visitors were masked.     PMH:  has no past medical history on file.    MEDS:   Current Outpatient Medications:   •  meclizine (ANTIVERT) 25 MG Tab, Take 1 Tab by mouth every 6 hours as needed for Dizziness, Nausea/Vomiting or Vertigo., Disp: 30 Tab, Rfl: 0  •  ondansetron (ZOFRAN ODT) 4 MG TABLET DISPERSIBLE, Take 1 Tab by mouth every 6 hours as needed for Nausea. Dissolve in mouth., Disp: 10 Tab, Rfl: 0  •  Levonorgest-Eth Estrad 91-Day 0.1-0.02 & 0.01 MG Tab, Take 1 tablet by mouth every day. TAKE 1 TABLET BY MOUTH DAILY, Disp: , Rfl: 4  •  sumatriptan (IMITREX) 100 MG tablet, TAKE 1 TABLET BY MOUTH AT ONSET OF HEADACHE, THEN TAKE AS NEEDED. MAX 1 TAB PER 24 HOURS, Disp: , Rfl: 11    Current Facility-Administered Medications:   •  ondansetron (ZOFRAN ODT) dispertab 4 mg, 4 mg, Oral, Once, [place]    ALLERGIES: No Known Allergies    SURGHX:   Past Surgical History:   Procedure Laterality Date   • DENTAL EXTRACTION(S)       SOCHX:  reports that she has never smoked. She has never used smokeless tobacco. She reports  "current alcohol use. She reports that she does not use drugs.     FH: Reviewed with patient, not pertinent to this visit.    Review of Systems   Constitutional: Negative.  Negative for chills, fever and malaise/fatigue.   HENT: Negative.  Negative for congestion, ear pain, sinus pain and sore throat.    Eyes: Negative for blurred vision.   Respiratory: Negative for cough.    Gastrointestinal: Positive for nausea and vomiting.   Neurological: Positive for dizziness. Negative for tingling, sensory change, weakness and headaches.   All other systems reviewed and are negative.    Additional details per HPI.      Objective:     /88 (BP Location: Left arm, Patient Position: Sitting, BP Cuff Size: Adult)   Pulse 76   Temp 36.4 °C (97.6 °F) (Temporal)   Resp 18   Ht 1.626 m (5' 4\") Comment: pt stated  Wt 83.9 kg (185 lb) Comment: pt stated  SpO2 98%   BMI 31.76 kg/m²     Physical Exam  Vitals signs reviewed.   Constitutional:       General: She is not in acute distress.     Appearance: She is well-developed. She is not ill-appearing or toxic-appearing.   HENT:      Head: Normocephalic.      Right Ear: Tympanic membrane and external ear normal.      Left Ear: Tympanic membrane and external ear normal.      Nose: Rhinorrhea present. Rhinorrhea is clear.      Mouth/Throat:      Mouth: Mucous membranes are moist.      Pharynx: Oropharynx is clear. Uvula midline.   Eyes:      General: Vision grossly intact.      Extraocular Movements: Extraocular movements intact.      Right eye: Nystagmus present.      Left eye: Nystagmus present.      Conjunctiva/sclera: Conjunctivae normal.      Pupils: Pupils are equal, round, and reactive to light.      Comments: + [place]-Hallpike maneuver, left   Neck:      Musculoskeletal: Normal range of motion.   Cardiovascular:      Rate and Rhythm: Normal rate.   Pulmonary:      Effort: Pulmonary effort is normal. No respiratory distress.   Musculoskeletal: Normal range of motion.         " General: No deformity.   Skin:     General: Skin is warm and dry.      Coloration: Skin is not pale.   Neurological:      General: No focal deficit present.      Mental Status: She is alert and oriented to person, place, and time.      Sensory: No sensory deficit.      Motor: No weakness.      Coordination: Coordination normal.   Psychiatric:         Behavior: Behavior normal. Behavior is cooperative.       Assessment/Plan:     1. Vertigo  - meclizine (ANTIVERT) 25 MG Tab; Take 1 Tab by mouth every 6 hours as needed for Dizziness, Nausea/Vomiting or Vertigo.  Dispense: 30 Tab; Refill: 0  - REFERRAL TO ENT    2. Nausea and vomiting, intractability of vomiting not specified, unspecified vomiting type  - ondansetron (ZOFRAN ODT) dispertab 4 mg  - meclizine (ANTIVERT) 25 MG Tab; Take 1 Tab by mouth every 6 hours as needed for Dizziness, Nausea/Vomiting or Vertigo.  Dispense: 30 Tab; Refill: 0  - ondansetron (ZOFRAN ODT) 4 MG TABLET DISPERSIBLE; Take 1 Tab by mouth every 6 hours as needed for Nausea. Dissolve in mouth.  Dispense: 10 Tab; Refill: 0    Rx as above sent electronically.    Differential diagnosis, natural history, supportive care, rest, fluids, over-the-counter symptom management per 's instructions, close monitoring, and indications for immediate follow-up discussed.     Referral placed for ENT evaluation and treatment if symptoms do not improve.    Patient advised to: Return for 1) Symptoms that worsen/don't improve, or go to ER, 2) Follow up with primary care in 7-10 days.    All questions answered. Patient agrees with the plan of care.    Discharge summary provided.

## 2021-03-24 DIAGNOSIS — E66.9 OBESITY (BMI 30-39.9): ICD-10-CM

## 2021-03-24 DIAGNOSIS — Z13.1 SCREENING FOR DIABETES MELLITUS: ICD-10-CM

## 2021-03-24 DIAGNOSIS — Z13.6 SCREENING FOR CARDIOVASCULAR CONDITION: ICD-10-CM

## 2021-04-02 ENCOUNTER — HOSPITAL ENCOUNTER (OUTPATIENT)
Dept: LAB | Facility: MEDICAL CENTER | Age: 51
End: 2021-04-02
Attending: PHYSICIAN ASSISTANT
Payer: COMMERCIAL

## 2021-04-02 DIAGNOSIS — Z13.6 SCREENING FOR CARDIOVASCULAR CONDITION: ICD-10-CM

## 2021-04-02 DIAGNOSIS — Z13.1 SCREENING FOR DIABETES MELLITUS: ICD-10-CM

## 2021-04-02 DIAGNOSIS — E66.9 OBESITY (BMI 30-39.9): ICD-10-CM

## 2021-04-02 LAB
ALBUMIN SERPL BCP-MCNC: 3.9 G/DL (ref 3.2–4.9)
ALBUMIN/GLOB SERPL: 1.1 G/DL
ALP SERPL-CCNC: 74 U/L (ref 30–99)
ALT SERPL-CCNC: 23 U/L (ref 2–50)
ANION GAP SERPL CALC-SCNC: 8 MMOL/L (ref 7–16)
AST SERPL-CCNC: 18 U/L (ref 12–45)
BILIRUB SERPL-MCNC: 0.3 MG/DL (ref 0.1–1.5)
BUN SERPL-MCNC: 9 MG/DL (ref 8–22)
CALCIUM SERPL-MCNC: 8.6 MG/DL (ref 8.5–10.5)
CHLORIDE SERPL-SCNC: 103 MMOL/L (ref 96–112)
CHOLEST SERPL-MCNC: 134 MG/DL (ref 100–199)
CO2 SERPL-SCNC: 21 MMOL/L (ref 20–33)
CREAT SERPL-MCNC: 0.72 MG/DL (ref 0.5–1.4)
EST. AVERAGE GLUCOSE BLD GHB EST-MCNC: 151 MG/DL
FASTING STATUS PATIENT QL REPORTED: NORMAL
GLOBULIN SER CALC-MCNC: 3.7 G/DL (ref 1.9–3.5)
GLUCOSE SERPL-MCNC: 82 MG/DL (ref 65–99)
HBA1C MFR BLD: 6.9 % (ref 4–5.6)
HDLC SERPL-MCNC: 42 MG/DL
LDLC SERPL CALC-MCNC: 73 MG/DL
POTASSIUM SERPL-SCNC: 4 MMOL/L (ref 3.6–5.5)
PROT SERPL-MCNC: 7.6 G/DL (ref 6–8.2)
SODIUM SERPL-SCNC: 132 MMOL/L (ref 135–145)
TRIGL SERPL-MCNC: 94 MG/DL (ref 0–149)

## 2021-04-02 PROCEDURE — 36415 COLL VENOUS BLD VENIPUNCTURE: CPT

## 2021-04-02 PROCEDURE — 80053 COMPREHEN METABOLIC PANEL: CPT

## 2021-04-02 PROCEDURE — 80061 LIPID PANEL: CPT

## 2021-04-02 PROCEDURE — 83036 HEMOGLOBIN GLYCOSYLATED A1C: CPT

## 2021-04-06 ENCOUNTER — TELEMEDICINE (OUTPATIENT)
Dept: MEDICAL GROUP | Facility: PHYSICIAN GROUP | Age: 51
End: 2021-04-06
Payer: COMMERCIAL

## 2021-04-06 VITALS — WEIGHT: 183 LBS | BODY MASS INDEX: 31.24 KG/M2 | RESPIRATION RATE: 16 BRPM | HEIGHT: 64 IN

## 2021-04-06 DIAGNOSIS — R77.1 ELEVATED SERUM GLOBULIN LEVEL: ICD-10-CM

## 2021-04-06 DIAGNOSIS — E11.9 TYPE 2 DIABETES MELLITUS WITHOUT COMPLICATION, WITHOUT LONG-TERM CURRENT USE OF INSULIN (HCC): ICD-10-CM

## 2021-04-06 DIAGNOSIS — E87.1 HYPONATREMIA: ICD-10-CM

## 2021-04-06 PROCEDURE — 99213 OFFICE O/P EST LOW 20 MIN: CPT | Mod: 95,CR | Performed by: PHYSICIAN ASSISTANT

## 2021-04-07 NOTE — PROGRESS NOTES
Virtual Visit: Established Patient   This visit was conducted via Zoom using secure and encrypted videoconferencing technology. The patient was in a private location in the state of [place].    The patient's identity was confirmed and verbal consent was obtained for this virtual visit.    Subjective:   CC: Follow-up on lab work results  Chief Complaint   Patient presents with   • Follow-Up   • Lab Results       Shanae Burr is a 50 y.o. female presenting for evaluation and management of:    Type 2 diabetes mellitus without complication, without long-term current use of insulin ([facility])  New diagnosis.  Discuss Hemoglobin A1c results from 4/2/2021 with patient.  Hemoglobin A1c was 6.9.  Patient is asymptomatic.  She tells me that her diet could improve and she does not have a regular exercise routine.  Admits to eating more carbohydrates than she should.  States she has bread for breakfast, lunch, and dinner.  She tells me that she will eat a dessert after dinner.  Patient will be following up with a provider at [facility] and participating in obesity care management program.    Hyponatremia  Elevated serum globulin level  Discussed recent CMP lab work results with patient.  Globulin was slightly elevated and sodium was slightly low.  He does not feel that she over hydrated before completing lab work.  Patient is asymptomatic.  To repeat lab work in 6 weeks.      ROS   Denies any recent fevers or chills. No nausea or vomiting. No chest pains or shortness of breath.     No Known Allergies    Current medicines (including changes today)  Current Outpatient Medications   Medication Sig Dispense Refill   • Levonorgest-Eth Estrad 91-Day 0.1-0.02 & 0.01 MG Tab Take 1 tablet by mouth every day. TAKE 1 TABLET BY MOUTH DAILY  4   • sumatriptan (IMITREX) 100 MG tablet TAKE 1 TABLET BY MOUTH AT ONSET OF HEADACHE, THEN TAKE AS NEEDED. MAX 1 TAB PER 24 HOURS  11     No current facility-administered medications for this visit.  "      Patient Active Problem List    Diagnosis Date Noted   • Obesity (BMI 30-39.9) 06/28/2018   • Lateral epicondylitis of left elbow 06/28/2018   • Migraine without aura and without status migrainosus, not intractable 06/28/2018       Family History   Problem Relation Age of Onset   • No Known Problems Mother    • No Known Problems Father    • Diabetes Maternal Grandfather         Type II   • Heart Disease Maternal Grandfather    • No Known Problems Daughter    • No Known Problems Daughter        She  has no past medical history on file.  She  has a past surgical history that includes dental extraction(s).       Objective:   Resp 16   Ht 1.626 m (5' 4\") Comment: pt. reported  Wt 83 kg (183 lb) Comment: pt. reported  LMP 03/17/2021 (Within Days)   BMI 31.41 kg/m²     Physical Exam:  Constitutional: Alert, no distress, well-groomed.  Skin: No rashes in visible areas.  Eye: Round. Conjunctiva clear, lids normal. No icterus.   ENMT: Lips pink without lesions, good dentition, moist mucous membranes. Phonation normal.  Neck: No masses, no thyromegaly. Moves freely without pain.  Respiratory: Unlabored respiratory effort, no cough or audible wheeze  Psych: Alert and oriented x3, normal affect and mood.       Assessment and Plan:   The following treatment plan was discussed:     1. Type 2 diabetes mellitus without complication, without long-term current use of insulin (HCC)  New Diagnosis.  Discussed recent hemoglobin A1c results from 4/02/21.  Hemoglobin A1c 6.9.  Discussed importance of healthy diet and regular exercise routine with patient.  Advised patient decrease carbohydrate and sugar consumption.  Suggested patient to set small goals and once goals are obtained to recent new goals.  Completed lab portion results on [facility] obesity care management paperwork.  Patient will be mailed hardcopy and documentation has been scanned in patient's chart.  Per patient request, documentation was faxed to patient.    We will " repeat hemoglobin A1c lab work in 3-6 months for evaluation.    - Comp Metabolic Panel; Future  - HEMOGLOBIN A1C; Future  - MICROALB/CREAT RATIO RAND. UR    2. Hyponatremia  New problem.  Patient repeat lab work in 6 weeks for evaluation.  Patient was advised she does not need to fast to complete this lab work.    - Comp Metabolic Panel; Future    3. Elevated serum globulin level  Reviewed patient's recent lab work results.  Serum globulin slightly elevated.  Patient repeat lab work in 6 for further evaluation.  His serum globulin is still elevated additional lab work will be ordered.    - Comp Metabolic Panel; Future            Follow-up: Return in about 6 months (around 10/6/2021), or if symptoms worsen or fail to improve.

## 2022-09-06 ENCOUNTER — OFFICE VISIT (OUTPATIENT)
Dept: MEDICAL GROUP | Facility: PHYSICIAN GROUP | Age: 52
End: 2022-09-06
Payer: COMMERCIAL

## 2022-09-06 VITALS
WEIGHT: 179.4 LBS | HEIGHT: 63 IN | DIASTOLIC BLOOD PRESSURE: 84 MMHG | OXYGEN SATURATION: 96 % | SYSTOLIC BLOOD PRESSURE: 122 MMHG | BODY MASS INDEX: 31.79 KG/M2 | TEMPERATURE: 99.5 F | HEART RATE: 88 BPM

## 2022-09-06 DIAGNOSIS — Z12.31 ENCOUNTER FOR SCREENING MAMMOGRAM FOR MALIGNANT NEOPLASM OF BREAST: ICD-10-CM

## 2022-09-06 DIAGNOSIS — E66.9 OBESITY (BMI 30-39.9): ICD-10-CM

## 2022-09-06 DIAGNOSIS — Z12.11 SCREENING FOR COLORECTAL CANCER: ICD-10-CM

## 2022-09-06 DIAGNOSIS — Z12.12 SCREENING FOR COLORECTAL CANCER: ICD-10-CM

## 2022-09-06 DIAGNOSIS — R73.03 PREDIABETES: ICD-10-CM

## 2022-09-06 DIAGNOSIS — E11.9 TYPE 2 DIABETES MELLITUS WITHOUT COMPLICATION, WITHOUT LONG-TERM CURRENT USE OF INSULIN (HCC): ICD-10-CM

## 2022-09-06 DIAGNOSIS — M25.531 RIGHT WRIST PAIN: ICD-10-CM

## 2022-09-06 LAB
HBA1C MFR BLD: 6 % (ref 0–5.6)
INT CON NEG: NEGATIVE
INT CON POS: POSITIVE

## 2022-09-06 PROCEDURE — 99214 OFFICE O/P EST MOD 30 MIN: CPT

## 2022-09-06 PROCEDURE — 83036 HEMOGLOBIN GLYCOSYLATED A1C: CPT

## 2022-09-06 RX ORDER — VITS A,C,E/LUTEIN/MINERALS 300MCG-200
1 TABLET ORAL DAILY
COMMUNITY

## 2022-09-06 SDOH — ECONOMIC STABILITY: TRANSPORTATION INSECURITY
IN THE PAST 12 MONTHS, HAS LACK OF RELIABLE TRANSPORTATION KEPT YOU FROM MEDICAL APPOINTMENTS, MEETINGS, WORK OR FROM GETTING THINGS NEEDED FOR DAILY LIVING?: NO

## 2022-09-06 SDOH — ECONOMIC STABILITY: INCOME INSECURITY: IN THE LAST 12 MONTHS, WAS THERE A TIME WHEN YOU WERE NOT ABLE TO PAY THE MORTGAGE OR RENT ON TIME?: NO

## 2022-09-06 SDOH — HEALTH STABILITY: PHYSICAL HEALTH: ON AVERAGE, HOW MANY MINUTES DO YOU ENGAGE IN EXERCISE AT THIS LEVEL?: 80 MIN

## 2022-09-06 SDOH — ECONOMIC STABILITY: TRANSPORTATION INSECURITY
IN THE PAST 12 MONTHS, HAS THE LACK OF TRANSPORTATION KEPT YOU FROM MEDICAL APPOINTMENTS OR FROM GETTING MEDICATIONS?: NO

## 2022-09-06 SDOH — ECONOMIC STABILITY: INCOME INSECURITY: HOW HARD IS IT FOR YOU TO PAY FOR THE VERY BASICS LIKE FOOD, HOUSING, MEDICAL CARE, AND HEATING?: NOT VERY HARD

## 2022-09-06 SDOH — ECONOMIC STABILITY: HOUSING INSECURITY
IN THE LAST 12 MONTHS, WAS THERE A TIME WHEN YOU DID NOT HAVE A STEADY PLACE TO SLEEP OR SLEPT IN A SHELTER (INCLUDING NOW)?: NO

## 2022-09-06 SDOH — ECONOMIC STABILITY: FOOD INSECURITY: WITHIN THE PAST 12 MONTHS, YOU WORRIED THAT YOUR FOOD WOULD RUN OUT BEFORE YOU GOT MONEY TO BUY MORE.: NEVER TRUE

## 2022-09-06 SDOH — ECONOMIC STABILITY: HOUSING INSECURITY: IN THE LAST 12 MONTHS, HOW MANY PLACES HAVE YOU LIVED?: 2

## 2022-09-06 SDOH — HEALTH STABILITY: MENTAL HEALTH
STRESS IS WHEN SOMEONE FEELS TENSE, NERVOUS, ANXIOUS, OR CAN'T SLEEP AT NIGHT BECAUSE THEIR MIND IS TROUBLED. HOW STRESSED ARE YOU?: NOT AT ALL

## 2022-09-06 SDOH — ECONOMIC STABILITY: FOOD INSECURITY: WITHIN THE PAST 12 MONTHS, THE FOOD YOU BOUGHT JUST DIDN'T LAST AND YOU DIDN'T HAVE MONEY TO GET MORE.: NEVER TRUE

## 2022-09-06 SDOH — HEALTH STABILITY: PHYSICAL HEALTH: ON AVERAGE, HOW MANY DAYS PER WEEK DO YOU ENGAGE IN MODERATE TO STRENUOUS EXERCISE (LIKE A BRISK WALK)?: 2 DAYS

## 2022-09-06 SDOH — ECONOMIC STABILITY: TRANSPORTATION INSECURITY
IN THE PAST 12 MONTHS, HAS LACK OF TRANSPORTATION KEPT YOU FROM MEETINGS, WORK, OR FROM GETTING THINGS NEEDED FOR DAILY LIVING?: NO

## 2022-09-06 ASSESSMENT — SOCIAL DETERMINANTS OF HEALTH (SDOH)
HOW OFTEN DO YOU HAVE A DRINK CONTAINING ALCOHOL: MONTHLY OR LESS
HOW OFTEN DO YOU GET TOGETHER WITH FRIENDS OR RELATIVES?: ONCE A WEEK
HOW OFTEN DO YOU ATTEND CHURCH OR RELIGIOUS SERVICES?: NEVER
HOW OFTEN DO YOU GET TOGETHER WITH FRIENDS OR RELATIVES?: ONCE A WEEK
HOW HARD IS IT FOR YOU TO PAY FOR THE VERY BASICS LIKE FOOD, HOUSING, MEDICAL CARE, AND HEATING?: NOT VERY HARD
DO YOU BELONG TO ANY CLUBS OR ORGANIZATIONS SUCH AS CHURCH GROUPS UNIONS, FRATERNAL OR ATHLETIC GROUPS, OR SCHOOL GROUPS?: NO
IN A TYPICAL WEEK, HOW MANY TIMES DO YOU TALK ON THE PHONE WITH FAMILY, FRIENDS, OR NEIGHBORS?: NEVER
WITHIN THE PAST 12 MONTHS, YOU WORRIED THAT YOUR FOOD WOULD RUN OUT BEFORE YOU GOT THE MONEY TO BUY MORE: NEVER TRUE
HOW MANY DRINKS CONTAINING ALCOHOL DO YOU HAVE ON A TYPICAL DAY WHEN YOU ARE DRINKING: 1 OR 2
HOW OFTEN DO YOU ATTENT MEETINGS OF THE CLUB OR ORGANIZATION YOU BELONG TO?: PATIENT DECLINED
HOW OFTEN DO YOU ATTEND CHURCH OR RELIGIOUS SERVICES?: NEVER
HOW OFTEN DO YOU ATTENT MEETINGS OF THE CLUB OR ORGANIZATION YOU BELONG TO?: PATIENT DECLINED
IN A TYPICAL WEEK, HOW MANY TIMES DO YOU TALK ON THE PHONE WITH FAMILY, FRIENDS, OR NEIGHBORS?: NEVER
HOW OFTEN DO YOU HAVE SIX OR MORE DRINKS ON ONE OCCASION: NEVER
DO YOU BELONG TO ANY CLUBS OR ORGANIZATIONS SUCH AS CHURCH GROUPS UNIONS, FRATERNAL OR ATHLETIC GROUPS, OR SCHOOL GROUPS?: NO

## 2022-09-06 ASSESSMENT — LIFESTYLE VARIABLES
HOW OFTEN DO YOU HAVE SIX OR MORE DRINKS ON ONE OCCASION: NEVER
SKIP TO QUESTIONS 9-10: 1
HOW MANY STANDARD DRINKS CONTAINING ALCOHOL DO YOU HAVE ON A TYPICAL DAY: 1 OR 2
HOW OFTEN DO YOU HAVE A DRINK CONTAINING ALCOHOL: MONTHLY OR LESS
AUDIT-C TOTAL SCORE: 1

## 2022-09-06 ASSESSMENT — PATIENT HEALTH QUESTIONNAIRE - PHQ9: CLINICAL INTERPRETATION OF PHQ2 SCORE: 0

## 2022-09-06 NOTE — ASSESSMENT & PLAN NOTE
New condition of uncertain prognosis  -Right wrist x-ray ordered, if negative will likely follow-up with EMG study to rule out carpal tunnel syndrome  - Recommend wearing  Wrist splints at night

## 2022-09-06 NOTE — PROGRESS NOTES
"Subjective:     CC:  Diagnoses of Type 2 diabetes mellitus without complication, without long-term current use of insulin (HCC), Right wrist pain, Obesity (BMI 30-39.9), Prediabetes, Encounter for screening mammogram for malignant neoplasm of breast, and Screening for colorectal cancer were pertinent to this visit.    HISTORY OF THE PRESENT ILLNESS: Patient is a 51 y.o. female. This pleasant patient is here today to establish care and discuss the following problems:  HCC Gap Form    Last edited 09/06/22 16:17 PDT by Brannon Sam D.N.P.         Problem   Prediabetes    Chronic condition, active  - Hemoglobin A1c in clinic today 6.0%.  This is improved from last year's reading of 6.9%.  -Patient eats mostly healthy and exercises 2-3 times per week in the gym  -Denies polyuria, polydipsia, or polyphagia, no acute visual changes or peripheral neuropathy     Right Wrist Pain    Onset/LISA: 1 month-atraumatic  Location: right wrist  Duration: pain is constant  Characteristics: numbness and tingling to right hand with positive weakness and fatigue to right hand.  Denies neck pain.   Aggravating or relieving factors: Reports this is worsened upon waking in the morning.  Typing and use of mouse at certain angles aggrivates. Shaking hands out helps N/T especially after waking up  Treatments tried: topical NSAID-not helpful, ice- small relief, wears brace-helps       Obesity (Bmi 30-39.9)    This is a chronic condition.   Max weight: 190  Current weight: 179  BMI: 31.58  Diet: Mostly healthy  Exercise: 2-3 times per week, goes to gym  Goal Weight: 150           Health Maintenance: Mammogram and Cologuard ordered    ROS:   Review of Systems   Musculoskeletal:  Positive for joint pain.   All other systems reviewed and are negative.      Objective:     Exam: /84 (BP Location: Left arm, Patient Position: Sitting, BP Cuff Size: Adult)   Pulse 88   Temp 37.5 °C (99.5 °F) (Temporal)   Ht 1.605 m (5' 3.2\")   Wt 81.4 kg (179 " lb 6.4 oz)   SpO2 96%  Body mass index is 31.58 kg/m².    Physical Exam  Constitutional:       General: She is not in acute distress.     Appearance: Normal appearance. She is not ill-appearing or toxic-appearing.   HENT:      Head: Normocephalic.   Eyes:      Conjunctiva/sclera: Conjunctivae normal.   Pulmonary:      Effort: Pulmonary effort is normal.   Skin:     General: Skin is warm and dry.   Neurological:      General: No focal deficit present.      Mental Status: She is alert and oriented to person, place, and time.   Psychiatric:         Mood and Affect: Mood normal.         Behavior: Behavior normal.         Labs: No recent labs, POCT A1c in clinic 6%    Assessment & Plan: Medical Decision Making   51 y.o. female with the following -    Problem List Items Addressed This Visit       Obesity (BMI 30-39.9)     Chronic condition, active  - Recommend healthy lifestyle as discussed  - Labs as follows         Relevant Orders    Comp Metabolic Panel    Lipid Profile    TSH WITH REFLEX TO FT4    VITAMIN D,25 HYDROXY (DEFICIENCY)    POCT Hemoglobin A1C (Completed)    Prediabetes     Chronic condition improved  - Recommend continuation of healthy lifestyle  - Labs as follows         Relevant Orders    Comp Metabolic Panel    Lipid Profile    TSH WITH REFLEX TO FT4    VITAMIN D,25 HYDROXY (DEFICIENCY)    POCT Hemoglobin A1C (Completed)    DX-WRIST-LIMITED 2- RIGHT    Right wrist pain     New condition of uncertain prognosis  -Right wrist x-ray ordered, if negative will likely follow-up with EMG study to rule out carpal tunnel syndrome  - Recommend wearing  Wrist splints at night           Relevant Orders    DX-WRIST-LIMITED 2- RIGHT     Other Visit Diagnoses       Encounter for screening mammogram for malignant neoplasm of breast        Relevant Orders    MA-SCREENING MAMMO BILAT W/TOMOSYNTHESIS W/CAD    Screening for colorectal cancer        Relevant Orders    COLOGUARD (FIT DNA)            Differential diagnosis,  natural history, supportive care, and indications for immediate follow-up discussed.  Shared decision making approach was utilized, and patient is amendable with plan of care.  Patient understands to return to clinic or go to the emergency department if symptoms worsen. All questions and concerns addressed.      Return in about 6 months (around 3/6/2023).    Please note that this dictation was created using voice recognition software. I have made every reasonable attempt to correct obvious errors, but I expect that there are errors of grammar and possibly content that I did not discover before finalizing the note.

## 2022-09-22 ENCOUNTER — HOSPITAL ENCOUNTER (OUTPATIENT)
Dept: RADIOLOGY | Facility: MEDICAL CENTER | Age: 52
End: 2022-09-22
Payer: COMMERCIAL

## 2022-09-22 DIAGNOSIS — Z12.31 ENCOUNTER FOR SCREENING MAMMOGRAM FOR MALIGNANT NEOPLASM OF BREAST: ICD-10-CM

## 2022-09-22 PROCEDURE — 77067 SCR MAMMO BI INCL CAD: CPT

## 2022-10-08 ENCOUNTER — HOSPITAL ENCOUNTER (OUTPATIENT)
Dept: LAB | Facility: MEDICAL CENTER | Age: 52
End: 2022-10-08
Payer: COMMERCIAL

## 2022-10-08 DIAGNOSIS — E66.9 OBESITY (BMI 30-39.9): ICD-10-CM

## 2022-10-08 DIAGNOSIS — R73.03 PREDIABETES: ICD-10-CM

## 2022-10-08 LAB
25(OH)D3 SERPL-MCNC: 33 NG/ML (ref 30–100)
ALBUMIN SERPL BCP-MCNC: 4.1 G/DL (ref 3.2–4.9)
ALBUMIN/GLOB SERPL: 1.2 G/DL
ALP SERPL-CCNC: 103 U/L (ref 30–99)
ALT SERPL-CCNC: 16 U/L (ref 2–50)
ANION GAP SERPL CALC-SCNC: 11 MMOL/L (ref 7–16)
AST SERPL-CCNC: 20 U/L (ref 12–45)
BILIRUB SERPL-MCNC: 0.4 MG/DL (ref 0.1–1.5)
BUN SERPL-MCNC: 16 MG/DL (ref 8–22)
CALCIUM SERPL-MCNC: 9.1 MG/DL (ref 8.5–10.5)
CHLORIDE SERPL-SCNC: 106 MMOL/L (ref 96–112)
CHOLEST SERPL-MCNC: 153 MG/DL (ref 100–199)
CO2 SERPL-SCNC: 24 MMOL/L (ref 20–33)
CREAT SERPL-MCNC: 0.73 MG/DL (ref 0.5–1.4)
FASTING STATUS PATIENT QL REPORTED: NORMAL
GFR SERPLBLD CREATININE-BSD FMLA CKD-EPI: 100 ML/MIN/1.73 M 2
GLOBULIN SER CALC-MCNC: 3.5 G/DL (ref 1.9–3.5)
GLUCOSE SERPL-MCNC: 91 MG/DL (ref 65–99)
HDLC SERPL-MCNC: 49 MG/DL
LDLC SERPL CALC-MCNC: 93 MG/DL
POTASSIUM SERPL-SCNC: 4.1 MMOL/L (ref 3.6–5.5)
PROT SERPL-MCNC: 7.6 G/DL (ref 6–8.2)
SODIUM SERPL-SCNC: 141 MMOL/L (ref 135–145)
TRIGL SERPL-MCNC: 55 MG/DL (ref 0–149)
TSH SERPL DL<=0.005 MIU/L-ACNC: 0.98 UIU/ML (ref 0.38–5.33)

## 2022-10-08 PROCEDURE — 80061 LIPID PANEL: CPT

## 2022-10-08 PROCEDURE — 80053 COMPREHEN METABOLIC PANEL: CPT

## 2022-10-08 PROCEDURE — 84443 ASSAY THYROID STIM HORMONE: CPT

## 2022-10-08 PROCEDURE — 82306 VITAMIN D 25 HYDROXY: CPT

## 2022-10-08 PROCEDURE — 36415 COLL VENOUS BLD VENIPUNCTURE: CPT

## 2022-10-13 DIAGNOSIS — M25.532 LEFT WRIST PAIN: ICD-10-CM

## 2022-10-15 ENCOUNTER — HOSPITAL ENCOUNTER (OUTPATIENT)
Dept: RADIOLOGY | Facility: MEDICAL CENTER | Age: 52
End: 2022-10-15
Payer: COMMERCIAL

## 2022-10-15 DIAGNOSIS — M25.532 LEFT WRIST PAIN: ICD-10-CM

## 2022-10-15 DIAGNOSIS — R73.03 PREDIABETES: ICD-10-CM

## 2022-10-15 DIAGNOSIS — M25.531 RIGHT WRIST PAIN: ICD-10-CM

## 2022-10-15 PROCEDURE — 73100 X-RAY EXAM OF WRIST: CPT | Mod: LT

## 2022-10-15 PROCEDURE — 73100 X-RAY EXAM OF WRIST: CPT | Mod: RT

## 2022-10-18 DIAGNOSIS — M25.531 RIGHT WRIST PAIN: ICD-10-CM

## 2022-12-05 ENCOUNTER — OFFICE VISIT (OUTPATIENT)
Dept: MEDICAL GROUP | Facility: PHYSICIAN GROUP | Age: 52
End: 2022-12-05
Payer: COMMERCIAL

## 2022-12-05 ENCOUNTER — HOSPITAL ENCOUNTER (OUTPATIENT)
Facility: MEDICAL CENTER | Age: 52
End: 2022-12-05
Attending: FAMILY MEDICINE
Payer: COMMERCIAL

## 2022-12-05 VITALS
OXYGEN SATURATION: 100 % | TEMPERATURE: 97.4 F | HEART RATE: 64 BPM | HEIGHT: 63 IN | BODY MASS INDEX: 30.48 KG/M2 | SYSTOLIC BLOOD PRESSURE: 104 MMHG | WEIGHT: 172 LBS | DIASTOLIC BLOOD PRESSURE: 64 MMHG

## 2022-12-05 DIAGNOSIS — J06.9 ACUTE URI: ICD-10-CM

## 2022-12-05 LAB
EXTERNAL QUALITY CONTROL: NORMAL
FLUAV+FLUBV AG SPEC QL IA: NEGATIVE
INT CON NEG: NORMAL
INT CON POS: NORMAL
S PYO AG THROAT QL: NEGATIVE
SARS-COV+SARS-COV-2 AG RESP QL IA.RAPID: NEGATIVE

## 2022-12-05 PROCEDURE — 87426 SARSCOV CORONAVIRUS AG IA: CPT | Performed by: FAMILY MEDICINE

## 2022-12-05 PROCEDURE — U0005 INFEC AGEN DETEC AMPLI PROBE: HCPCS

## 2022-12-05 PROCEDURE — U0003 INFECTIOUS AGENT DETECTION BY NUCLEIC ACID (DNA OR RNA); SEVERE ACUTE RESPIRATORY SYNDROME CORONAVIRUS 2 (SARS-COV-2) (CORONAVIRUS DISEASE [COVID-19]), AMPLIFIED PROBE TECHNIQUE, MAKING USE OF HIGH THROUGHPUT TECHNOLOGIES AS DESCRIBED BY CMS-2020-01-R: HCPCS

## 2022-12-05 PROCEDURE — 87880 STREP A ASSAY W/OPTIC: CPT | Performed by: FAMILY MEDICINE

## 2022-12-05 PROCEDURE — 87804 INFLUENZA ASSAY W/OPTIC: CPT | Performed by: FAMILY MEDICINE

## 2022-12-05 PROCEDURE — 99213 OFFICE O/P EST LOW 20 MIN: CPT | Performed by: FAMILY MEDICINE

## 2022-12-05 NOTE — PROGRESS NOTES
"Chief Complaint   Patient presents with    Cough     Symptoms started Nov. 26th; tested negative on the 1st    Headache    Pharyngitis        HPI: Patient is a 51 y.o. female complaining of 9 days of illness including: chills, dry and productive cough, diarrhea, fever, nasal congestion, sore throat, wheezing, headache, myalgias .   Mucus is: bloody.  Similarly ill exposures: yes.  Treatments tried: OTC meds  She  reports that she has never smoked. She has never used smokeless tobacco..     Home COVID neg 12/1    ROS:  No fever, nausea, changes in bowel movements or skin rash.      I reviewed the patient's medications, allergies and medical history:  Current Outpatient Medications   Medication Sig Dispense Refill    Multiple Vitamin (MULTIVITAMIN PO) Take 2 Each by mouth every day. Smartypants Gummies      Cyanocobalamin (VITAMIN B-12 PO) Take 2 Each by mouth every day.      Multiple Vitamins-Minerals (OCUVITE-LUTEIN) Tab Take 1 Tablet by mouth every day.      sumatriptan (IMITREX) 100 MG tablet TAKE 1 TABLET BY MOUTH AT ONSET OF HEADACHE, THEN TAKE AS NEEDED. MAX 1 TAB PER 24 HOURS  11     No current facility-administered medications for this visit.     Patient has no known allergies.  Past Medical History:   Diagnosis Date    Diabetes ([facility])         EXAM:  /64 (BP Location: Right arm, Patient Position: Sitting, BP Cuff Size: Adult)   Pulse 64   Temp 36.3 °C (97.4 °F) (Temporal)   Ht 1.605 m (5' 3.2\")   Wt 78 kg (172 lb)   SpO2 100%   General: Alert, no conversational dyspnea or audible wheeze, non-toxic appearance.  Eyes: PERRL, conjunctiva slightly injected, no eye discharge.  Ears: Normal pinnae,TM normal on right. Left canal impacted with cerumen.  Nares: Patent with thin mucus.  Sinuses: non tender over maxillary / frontal sinuses.  Throat: Erythematous injection without exudate.   Neck: Supple, with no adenopathy.  Lungs: Clear to auscultation bilaterally, no wheeze, crackles or rhonchi.   Heart: " Regular rate without murmur.  Skin: Warm and dry without rash.    Results for orders placed or performed in visit on 12/05/22   POCT Rapid Strep A   Result Value Ref Range    Rapid Strep Screen Negative     Internal Control Positive Valid     Internal Control Negative Valid    POCT Influenza A/B   Result Value Ref Range    Rapid Influenza A-B Negative     Internal Control Positive Valid     Internal Control Negative Valid    POCT SARS-COV Antigen MARVA (Symptomatic only)   Result Value Ref Range    Internal  Valid     SARS-COV ANTIGEN MARVA Negative Negative, Indeterminate, None Detected, Not Detected, Detected, NotDetected, Valid, Invalid, Pass    Internal Control Positive Valid     Internal Control Negative Valid           ASSESSMENT:   1. Acute URI          PLAN:  1. Educated patient that majority of upper respiratory infections are viral and do not need antibiotics.   2. Twice daily use of nasal saline rinse or Neti-Pot.  3. OTC anti-pyretics and decongestants as needed.  4. Follow-up in office or urgent care for worsening symptoms, difficulty breathing, lack of expected recovery, or should new symptoms or problems arise.

## 2022-12-06 DIAGNOSIS — J06.9 ACUTE URI: ICD-10-CM

## 2022-12-06 LAB
SARS-COV-2 RNA RESP QL NAA+PROBE: NOTDETECTED
SPECIMEN SOURCE: NORMAL

## 2023-10-16 ENCOUNTER — RESEARCH ENCOUNTER (OUTPATIENT)
Dept: RESEARCH | Facility: MEDICAL CENTER | Age: 53
End: 2023-10-16
Payer: COMMERCIAL

## 2023-10-16 DIAGNOSIS — F43.10 RAPE TRAUMA SYNDROME: ICD-10-CM

## 2023-10-16 NOTE — RESEARCH NOTE
Confirmed with the participant they do not have a designated provider whom they would like study results shared with. Patient will have an opportunity to share the results with any providers of their choosing in the future by accessing their results in [employer].

## 2023-10-25 ENCOUNTER — HOSPITAL ENCOUNTER (OUTPATIENT)
Dept: LAB | Facility: MEDICAL CENTER | Age: 53
End: 2023-10-25
Attending: INTERNAL MEDICINE
Payer: COMMERCIAL

## 2023-10-25 DIAGNOSIS — F43.10 RAPE TRAUMA SYNDROME: ICD-10-CM

## 2023-11-09 SDOH — HEALTH STABILITY: PHYSICAL HEALTH: ON AVERAGE, HOW MANY MINUTES DO YOU ENGAGE IN EXERCISE AT THIS LEVEL?: 60 MIN

## 2023-11-09 SDOH — ECONOMIC STABILITY: INCOME INSECURITY: IN THE LAST 12 MONTHS, WAS THERE A TIME WHEN YOU WERE NOT ABLE TO PAY THE MORTGAGE OR RENT ON TIME?: NO

## 2023-11-09 SDOH — HEALTH STABILITY: MENTAL HEALTH
STRESS IS WHEN SOMEONE FEELS TENSE, NERVOUS, ANXIOUS, OR CAN'T SLEEP AT NIGHT BECAUSE THEIR MIND IS TROUBLED. HOW STRESSED ARE YOU?: ONLY A LITTLE

## 2023-11-09 SDOH — ECONOMIC STABILITY: FOOD INSECURITY: WITHIN THE PAST 12 MONTHS, YOU WORRIED THAT YOUR FOOD WOULD RUN OUT BEFORE YOU GOT MONEY TO BUY MORE.: NEVER TRUE

## 2023-11-09 SDOH — ECONOMIC STABILITY: INCOME INSECURITY: HOW HARD IS IT FOR YOU TO PAY FOR THE VERY BASICS LIKE FOOD, HOUSING, MEDICAL CARE, AND HEATING?: NOT HARD AT ALL

## 2023-11-09 SDOH — ECONOMIC STABILITY: FOOD INSECURITY: WITHIN THE PAST 12 MONTHS, THE FOOD YOU BOUGHT JUST DIDN'T LAST AND YOU DIDN'T HAVE MONEY TO GET MORE.: NEVER TRUE

## 2023-11-09 SDOH — HEALTH STABILITY: PHYSICAL HEALTH: ON AVERAGE, HOW MANY DAYS PER WEEK DO YOU ENGAGE IN MODERATE TO STRENUOUS EXERCISE (LIKE A BRISK WALK)?: 2 DAYS

## 2023-11-09 SDOH — ECONOMIC STABILITY: HOUSING INSECURITY: IN THE LAST 12 MONTHS, HOW MANY PLACES HAVE YOU LIVED?: 1

## 2023-11-09 ASSESSMENT — SOCIAL DETERMINANTS OF HEALTH (SDOH)
HOW OFTEN DO YOU ATTEND CHURCH OR RELIGIOUS SERVICES?: NEVER
HOW HARD IS IT FOR YOU TO PAY FOR THE VERY BASICS LIKE FOOD, HOUSING, MEDICAL CARE, AND HEATING?: NOT HARD AT ALL
DO YOU BELONG TO ANY CLUBS OR ORGANIZATIONS SUCH AS CHURCH GROUPS UNIONS, FRATERNAL OR ATHLETIC GROUPS, OR SCHOOL GROUPS?: NO
DO YOU BELONG TO ANY CLUBS OR ORGANIZATIONS SUCH AS CHURCH GROUPS UNIONS, FRATERNAL OR ATHLETIC GROUPS, OR SCHOOL GROUPS?: NO
HOW MANY DRINKS CONTAINING ALCOHOL DO YOU HAVE ON A TYPICAL DAY WHEN YOU ARE DRINKING: 1 OR 2
IN A TYPICAL WEEK, HOW MANY TIMES DO YOU TALK ON THE PHONE WITH FAMILY, FRIENDS, OR NEIGHBORS?: ONCE A WEEK
HOW OFTEN DO YOU ATTENT MEETINGS OF THE CLUB OR ORGANIZATION YOU BELONG TO?: NEVER
HOW OFTEN DO YOU ATTENT MEETINGS OF THE CLUB OR ORGANIZATION YOU BELONG TO?: NEVER
WITHIN THE PAST 12 MONTHS, YOU WORRIED THAT YOUR FOOD WOULD RUN OUT BEFORE YOU GOT THE MONEY TO BUY MORE: NEVER TRUE
HOW OFTEN DO YOU HAVE SIX OR MORE DRINKS ON ONE OCCASION: NEVER
HOW OFTEN DO YOU GET TOGETHER WITH FRIENDS OR RELATIVES?: PATIENT DECLINED
HOW OFTEN DO YOU HAVE A DRINK CONTAINING ALCOHOL: MONTHLY OR LESS
IN A TYPICAL WEEK, HOW MANY TIMES DO YOU TALK ON THE PHONE WITH FAMILY, FRIENDS, OR NEIGHBORS?: ONCE A WEEK
HOW OFTEN DO YOU GET TOGETHER WITH FRIENDS OR RELATIVES?: PATIENT DECLINED
HOW OFTEN DO YOU ATTEND CHURCH OR RELIGIOUS SERVICES?: NEVER

## 2023-11-09 ASSESSMENT — LIFESTYLE VARIABLES
HOW MANY STANDARD DRINKS CONTAINING ALCOHOL DO YOU HAVE ON A TYPICAL DAY: 1 OR 2
HOW OFTEN DO YOU HAVE A DRINK CONTAINING ALCOHOL: MONTHLY OR LESS
AUDIT-C TOTAL SCORE: 1
HOW OFTEN DO YOU HAVE SIX OR MORE DRINKS ON ONE OCCASION: NEVER
SKIP TO QUESTIONS 9-10: 1

## 2023-11-10 ENCOUNTER — OFFICE VISIT (OUTPATIENT)
Dept: MEDICAL GROUP | Facility: PHYSICIAN GROUP | Age: 53
End: 2023-11-10
Payer: COMMERCIAL

## 2023-11-10 ENCOUNTER — HOSPITAL ENCOUNTER (OUTPATIENT)
Facility: MEDICAL CENTER | Age: 53
End: 2023-11-10
Payer: COMMERCIAL

## 2023-11-10 ENCOUNTER — TELEPHONE (OUTPATIENT)
Dept: MEDICAL GROUP | Facility: PHYSICIAN GROUP | Age: 53
End: 2023-11-10

## 2023-11-10 VITALS
HEIGHT: 63 IN | BODY MASS INDEX: 32.46 KG/M2 | TEMPERATURE: 97.8 F | WEIGHT: 183.2 LBS | SYSTOLIC BLOOD PRESSURE: 108 MMHG | DIASTOLIC BLOOD PRESSURE: 70 MMHG | HEART RATE: 71 BPM | OXYGEN SATURATION: 98 %

## 2023-11-10 DIAGNOSIS — G89.29 CHRONIC BILATERAL LOW BACK PAIN WITH BILATERAL SCIATICA: ICD-10-CM

## 2023-11-10 DIAGNOSIS — Z12.4 SCREENING FOR CERVICAL CANCER: ICD-10-CM

## 2023-11-10 DIAGNOSIS — M54.41 CHRONIC BILATERAL LOW BACK PAIN WITH BILATERAL SCIATICA: ICD-10-CM

## 2023-11-10 DIAGNOSIS — Z13.6 SCREENING FOR CARDIOVASCULAR CONDITION: ICD-10-CM

## 2023-11-10 DIAGNOSIS — Z01.419 WELL WOMAN EXAM: ICD-10-CM

## 2023-11-10 DIAGNOSIS — M54.42 CHRONIC BILATERAL LOW BACK PAIN WITH BILATERAL SCIATICA: ICD-10-CM

## 2023-11-10 DIAGNOSIS — Z12.31 ENCOUNTER FOR SCREENING MAMMOGRAM FOR MALIGNANT NEOPLASM OF BREAST: ICD-10-CM

## 2023-11-10 DIAGNOSIS — E66.9 OBESITY (BMI 30-39.9): ICD-10-CM

## 2023-11-10 DIAGNOSIS — G43.009 MIGRAINE WITHOUT AURA AND WITHOUT STATUS MIGRAINOSUS, NOT INTRACTABLE: ICD-10-CM

## 2023-11-10 DIAGNOSIS — R73.03 PREDIABETES: ICD-10-CM

## 2023-11-10 DIAGNOSIS — J30.2 SEASONAL ALLERGIES: ICD-10-CM

## 2023-11-10 PROBLEM — M54.50 CHRONIC LOW BACK PAIN: Status: ACTIVE | Noted: 2023-11-10

## 2023-11-10 PROCEDURE — 99214 OFFICE O/P EST MOD 30 MIN: CPT | Mod: 25

## 2023-11-10 PROCEDURE — 3078F DIAST BP <80 MM HG: CPT

## 2023-11-10 PROCEDURE — 99396 PREV VISIT EST AGE 40-64: CPT

## 2023-11-10 PROCEDURE — 88175 CYTOPATH C/V AUTO FLUID REDO: CPT

## 2023-11-10 PROCEDURE — 99000 SPECIMEN HANDLING OFFICE-LAB: CPT

## 2023-11-10 PROCEDURE — 3074F SYST BP LT 130 MM HG: CPT

## 2023-11-10 PROCEDURE — 87624 HPV HI-RISK TYP POOLED RSLT: CPT

## 2023-11-10 RX ORDER — MELOXICAM 15 MG/1
15 TABLET ORAL DAILY
Qty: 30 TABLET | Refills: 0 | Status: SHIPPED | OUTPATIENT
Start: 2023-11-10 | End: 2023-12-08

## 2023-11-10 RX ORDER — CYCLOBENZAPRINE HCL 5 MG
5 TABLET ORAL 3 TIMES DAILY PRN
Qty: 30 TABLET | Refills: 0 | Status: SHIPPED | OUTPATIENT
Start: 2023-11-10

## 2023-11-10 RX ORDER — SUMATRIPTAN 100 MG/1
TABLET, FILM COATED ORAL
Qty: 10 TABLET | Refills: 11 | Status: SHIPPED | OUTPATIENT
Start: 2023-11-10

## 2023-11-10 RX ORDER — AZELASTINE HYDROCHLORIDE, FLUTICASONE PROPIONATE 137; 50 UG/1; UG/1
1 SPRAY, METERED NASAL DAILY
Qty: 23 G | Refills: 2 | Status: SHIPPED | OUTPATIENT
Start: 2023-11-10

## 2023-11-10 ASSESSMENT — PATIENT HEALTH QUESTIONNAIRE - PHQ9: CLINICAL INTERPRETATION OF PHQ2 SCORE: 0

## 2023-11-10 NOTE — ASSESSMENT & PLAN NOTE
Chronic condition currently stable  - Refill sent for sumatriptan 100 mg to take at onset of migraine recommend taking immediately upon headache symptoms for the past test results.

## 2023-11-10 NOTE — ASSESSMENT & PLAN NOTE
Chronic condition uncontrolled with exacerbation  -Provided patient with prescription for Dymista 137-50 mcg per attenuation spray 1 to 2 sprays daily following use of NeilMed sinus rinse as discussed  Tips for allergy relief:    Try to avoid allergens:   Avoid being outside during peak allergy hours and during strong winds  Take shoes off when you come indoors so you are not tracking pollen through the home.  Keep windows and doors closed and use air conditioning to cool the home.  Shower, shampoo, and scrub eyebrows and eyelashes before bed.  Use OTC Efrain Med Sinus Rinse (YouTube video as discussed) followed by intranasal corticosteroid such as Flonase or Nasocort, in addition you may try Astepro Allergy  Medicines are more effective with daily use.  Try Zyrtec or Allegra tablets daily (OTC).  OTC allergy eye drops such as zaditor or naphcon as needed.   During peak allergy seasons, you may find that no one medicine provides complete relief and multiple medicines are needed.

## 2023-11-10 NOTE — ASSESSMENT & PLAN NOTE
-Chronic, uncontrolled with exacerbation  -Walking is the best activity for low back pain.  Prolonged immobilization may worsen condition. No heavy lifting, bending or twisting  -Okay to use Ice/heat 20 min on/off cycles as needed for discomfort  -OTC pain relievers as directed: Acetaminophen 500 mg every 4-6 hours if needed and Meloxicam 15 mg daily if needed for pain.   Flexeril 5 mg TID for muscle spasm  Patient advised to proceed to the Emergency Room if she experiences the following symptoms: increasing pain, or pain with fever, leg weakness, loss of bowel/urine control, or new or progressive numbness/tingling in legs. Patient understands and agrees.  -Imaging ordered and referral to physiartry

## 2023-11-10 NOTE — PROGRESS NOTES
Subjective:     CC:   Chief Complaint   Patient presents with    Weight Loss    Pain     back    Other     Soreness  Wrists, stiffness      Medication Refill     sumatriptan       HPI:   Shanae Burr is a 52 y.o. female who presents for annual exam      Last Pap Smear: Few years  Last Mammogram:   Subjective:     CC:   Chief Complaint   Patient presents with    Weight Loss    Pain     back    Other     Soreness  Wrists, stiffness      Medication Refill     sumatriptan       HPI:   Shanae Burr is a 52 y.o. female who presents for annual exam she has a few other concerns as well as follows:    Problem   Chronic Low Back Pain    Onset: 1 year, atraumatic  Location low back  Radiation right side>left, pain radiates down legs  Duration Constant  Character ache  Alleviated by movement  Exacerbated by worse in morning or and at bedtime, standing for prolonged periods of time, sitting also aggravates  Associated symptoms none       Seasonal Allergies    Reports chronic exacerbation of allergy symptoms.  She has been using over-the-counter oral medication without relief.  She does report to feel wheezing and shortness of breath when she is exercising.     Prediabetes    Chronic condition, active  Lab Results   Component Value Date/Time    HBA1C 6.0 (A) 2022 04:00 PM    HBA1C 6.9 (H) 2021 02:59 PM   -Patient eats mostly healthy and exercises 2-3 times per week in the gym  -Denies polyuria, polydipsia, or polyphagia, no acute      Migraine Without Aura and Without Status Migrainosus, Not Intractable    Chronic condition-stable patient requesting refill for sumatriptan             Patient has GYN provider: No   Last Pap Smear:    Last Mammogram:   Last Bone Density Test: N/A  Last Colorectal Cancer Screenin  Last Tdap: UTD  Received HPV series: Aged out    OB History    Para Term  AB Living   2 2 0 0 0 0   SAB IAB Ectopic Molar Multiple Live Births   0 0 0 0 0 0       She  reports being sexually active and has had partner(s) who are male.    She  has a past medical history of Diabetes (HCC).  She  has a past surgical history that includes dental extraction(s).    Family History   Problem Relation Age of Onset    No Known Problems Mother     No Known Problems Father     Diabetes Maternal Grandfather         Type II    Heart Disease Maternal Grandfather     No Known Problems Daughter     No Known Problems Daughter      Social History     Tobacco Use    Smoking status: Never    Smokeless tobacco: Never   Vaping Use    Vaping Use: Never used   Substance Use Topics    Alcohol use: Yes     Comment: Socially. No Binge-Drinking.      Drug use: No       Patient Active Problem List    Diagnosis Date Noted    Chronic low back pain 11/10/2023    Seasonal allergies 11/10/2023    Prediabetes 09/06/2022    Right wrist pain 09/06/2022    Obesity (BMI 30-39.9) 06/28/2018    Lateral epicondylitis of left elbow 06/28/2018    Migraine without aura and without status migrainosus, not intractable 06/28/2018     Current Outpatient Medications   Medication Sig Dispense Refill    cyclobenzaprine (FLEXERIL) 5 mg tablet Take 1 Tablet by mouth 3 times a day as needed for Muscle Spasms. 30 Tablet 0    meloxicam (MOBIC) 15 MG tablet Take 1 Tablet by mouth every day. 30 Tablet 0    Azelastine-Fluticasone 137-50 MCG/ACT Suspension Administer 1 Spray into affected nostril(S) every day. 23 g 2    sumatriptan (IMITREX) 100 MG tablet TAKE 1 TABLET BY MOUTH AT ONSET OF HEADACHE, THEN TAKE AS NEEDED. MAX 1 TAB PER 24 HOURS 10 Tablet 11    Multiple Vitamin (MULTIVITAMIN PO) Take 2 Each by mouth every day. Smartypants Gummies      Cyanocobalamin (VITAMIN B-12 PO) Take 2 Each by mouth every day. (Patient not taking: Reported on 11/10/2023)      Multiple Vitamins-Minerals (OCUVITE-LUTEIN) Tab Take 1 Tablet by mouth every day. (Patient not taking: Reported on 11/10/2023)       No current facility-administered  "medications for this visit.     No Known Allergies    Review of Systems   Constitutional: Negative for fever, chills and malaise/fatigue.   HENT: Negative for congestion.    Eyes: Negative for pain.   Respiratory: Negative for cough and shortness of breath. Does report wheezing and SOB with exertion    Cardiovascular: Negative for chest pain and leg swelling.   Gastrointestinal: Negative for nausea, vomiting, abdominal pain and diarrhea.   Genitourinary: Negative for dysuria and hematuria.   Skin: Negative for rash.   Neurological: Negative for dizziness, focal weakness and headaches.   Endo/Heme/Allergies: Does not bruise/bleed easily.   Psychiatric/Behavioral: Negative for depression.  The patient is not nervous/anxious.      Objective:   /70 (BP Location: Left arm, Patient Position: Sitting, BP Cuff Size: Adult)   Pulse 71   Temp 36.6 °C (97.8 °F) (Temporal)   Ht 1.6 m (5' 3\")   Wt 83.1 kg (183 lb 3.2 oz)   LMP 03/17/2021 (Within Days)   SpO2 98%   BMI 32.45 kg/m²     Wt Readings from Last 4 Encounters:   11/10/23 83.1 kg (183 lb 3.2 oz)   12/05/22 78 kg (172 lb)   09/06/22 81.4 kg (179 lb 6.4 oz)   04/06/21 83 kg (183 lb)       A chaperone was offered to the patient during today's exam. Patient declined chaperone.    Physical Exam:  Constitutional: Well-developed and well-nourished. Not diaphoretic. No distress.   Skin: Skin is warm and dry. No rash noted.  Head: Atraumatic without lesions.  Eyes: Conjunctivae and extraocular motions are normal. Pupils are equal, round, and reactive to light. No scleral icterus.   Ears:  External ears unremarkable. Tympanic membranes clear and intact.  Nose: Nares patent. Septum midline. Turbinates without erythema nor edema. No discharge.   Mouth/Throat: Tongue normal. Oropharynx is clear and moist. Posterior pharynx without erythema or exudates.  Neck: Supple, trachea midline. Normal range of motion. No thyromegaly present. No lymphadenopathy--cervical or " supraclavicular.  Cardiovascular: Regular rate and rhythm, S1 and S2 without murmur, rubs, or gallops.    Respiratory: Effort normal. Clear to auscultation throughout. No adventitious sounds.   Breast: Breasts examined seated and supine. No skin changes, peau d'orange or nipple retraction. No discharge. No axillary or supraclavicular adenopathy. No masses or nodularity palpable.  Abdomen: Soft, non tender, and without distention. Active bowel sounds in all four quadrants. No rebound, guarding, masses or HSM.  : Perineum and external genitalia normal without rash. Vagina with normal and physiologic discharge. Cervix without visible lesions or discharge. Bimanual exam without adnexal masses or cervical motion tenderness.  Extremities: No cyanosis, clubbing, erythema, nor edema. Distal pulses intact and symmetric.   Musculoskeletal: All major joints AROM full in all directions without pain.  Neurological: Alert and oriented x 3. Grossly non-focal. Strength and sensation grossly intact. DTRs 2+/3 and symmetric.   Psychiatric:  Behavior, mood, and affect are appropriate.    Assessment and Plan:     1. Well woman exam  - THINPREP PAP WITH HPV; Future    2. Screening for cervical cancer  - THINPREP PAP WITH HPV; Future    3. Chronic bilateral low back pain with bilateral sciatica  - DX-LUMBAR SPINE-2 OR 3 VIEWS; Future  - cyclobenzaprine (FLEXERIL) 5 mg tablet; Take 1 Tablet by mouth 3 times a day as needed for Muscle Spasms.  Dispense: 30 Tablet; Refill: 0  - meloxicam (MOBIC) 15 MG tablet; Take 1 Tablet by mouth every day.  Dispense: 30 Tablet; Refill: 0  - Referral to Physical Therapy  - Comp Metabolic Panel; Future  - Referral to Pain Clinic    4. Prediabetes  - Comp Metabolic Panel; Future  - HEMOGLOBIN A1C; Future    5. Obesity (BMI 30-39.9)  - Comp Metabolic Panel; Future  - Lipid Profile; Future    6. Screening for cardiovascular condition  - Lipid Profile; Future    7. Seasonal allergies  -  Azelastine-Fluticasone 137-50 MCG/ACT Suspension; Administer 1 Spray into affected nostril(S) every day.  Dispense: 23 g; Refill: 2    8. Migraine without aura and without status migrainosus, not intractable  - sumatriptan (IMITREX) 100 MG tablet; TAKE 1 TABLET BY MOUTH AT ONSET OF HEADACHE, THEN TAKE AS NEEDED. MAX 1 TAB PER 24 HOURS  Dispense: 10 Tablet; Refill: 11    9. Encounter for screening mammogram for malignant neoplasm of breast  - MA-SCREENING MAMMO BILAT W/TOMOSYNTHESIS W/CAD; Future    Problem List Items Addressed This Visit       Obesity (BMI 30-39.9)    Relevant Orders    Comp Metabolic Panel    Lipid Profile    Migraine without aura and without status migrainosus, not intractable     Chronic condition currently stable  - Refill sent for sumatriptan 100 mg to take at onset of migraine recommend taking immediately upon headache symptoms for the past test results.         Relevant Medications    cyclobenzaprine (FLEXERIL) 5 mg tablet    meloxicam (MOBIC) 15 MG tablet    sumatriptan (IMITREX) 100 MG tablet    Prediabetes     Chronic condition improved  - Recommend continuation of healthy lifestyle  - Labs as follows         Relevant Orders    Comp Metabolic Panel    HEMOGLOBIN A1C    Chronic low back pain     -Chronic, uncontrolled with exacerbation  -Walking is the best activity for low back pain.  Prolonged immobilization may worsen condition. No heavy lifting, bending or twisting  -Okay to use Ice/heat 20 min on/off cycles as needed for discomfort  -OTC pain relievers as directed: Acetaminophen 500 mg every 4-6 hours if needed and Meloxicam 15 mg daily if needed for pain.   Flexeril 5 mg TID for muscle spasm  Patient advised to proceed to the Emergency Room if she experiences the following symptoms: increasing pain, or pain with fever, leg weakness, loss of bowel/urine control, or new or progressive numbness/tingling in legs. Patient understands and agrees.  -Imaging ordered and referral to physiartry           Relevant Medications    cyclobenzaprine (FLEXERIL) 5 mg tablet    meloxicam (MOBIC) 15 MG tablet    Other Relevant Orders    DX-LUMBAR SPINE-2 OR 3 VIEWS    Referral to Physical Therapy    Comp Metabolic Panel    Referral to Pain Clinic    Seasonal allergies     Chronic condition uncontrolled with exacerbation  -Provided patient with prescription for Dymista 137-50 mcg per attenuation spray 1 to 2 sprays daily following use of NeilMed sinus rinse as discussed  Tips for allergy relief:    Try to avoid allergens:   Avoid being outside during peak allergy hours and during strong winds  Take shoes off when you come indoors so you are not tracking pollen through the home.  Keep windows and doors closed and use air conditioning to cool the home.  Shower, shampoo, and scrub eyebrows and eyelashes before bed.  Use OTC Efrain Med Sinus Rinse (YouTube video as discussed) followed by intranasal corticosteroid such as Flonase or Nasocort, in addition you may try Astepro Allergy  Medicines are more effective with daily use.  Try Zyrtec or Allegra tablets daily (OTC).  OTC allergy eye drops such as zaditor or naphcon as needed.   During peak allergy seasons, you may find that no one medicine provides complete relief and multiple medicines are needed.            Relevant Medications    Azelastine-Fluticasone 137-50 MCG/ACT Suspension     Other Visit Diagnoses       Well woman exam        Relevant Orders    THINPREP PAP WITH HPV    Screening for cervical cancer        Relevant Orders    THINPREP PAP WITH HPV    Screening for cardiovascular condition        Relevant Orders    Lipid Profile    Encounter for screening mammogram for malignant neoplasm of breast        Relevant Orders    MA-SCREENING MAMMO BILAT W/TOMOSYNTHESIS W/CAD          I discussed with the patient the likelihood of costs associated with double billing for an acute or chronic problem & Wellness Visit. Patient is aware they may receive a bill for additional  services and/or copayment.    Health maintenance:  discussed, declines vaccines, mammogram ordered   Labs per orders  Immunizations per orders  Patient counseled about skin care, diet, supplements, and exercise.  Discussed  breast self exam, mammography screening, menopause, adequate intake of calcium and vitamin D, diet and exercise     Follow-up: Return in about 3 months (around 2/10/2024) for F/U Lab Review.

## 2023-11-15 LAB
CYTOLOGIST CVX/VAG CYTO: NORMAL
CYTOLOGY CVX/VAG DOC CYTO: NORMAL
CYTOLOGY CVX/VAG DOC THIN PREP: NORMAL
HPV I/H RISK 4 DNA CVX QL PROBE+SIG AMP: NEGATIVE
NOTE NL11727A: NORMAL
OTHER STN SPEC: NORMAL
STAT OF ADQ CVX/VAG CYTO-IMP: NORMAL

## 2023-11-18 ENCOUNTER — HOSPITAL ENCOUNTER (OUTPATIENT)
Dept: RADIOLOGY | Facility: MEDICAL CENTER | Age: 53
End: 2023-11-18
Payer: COMMERCIAL

## 2023-11-18 DIAGNOSIS — M54.42 CHRONIC BILATERAL LOW BACK PAIN WITH BILATERAL SCIATICA: ICD-10-CM

## 2023-11-18 DIAGNOSIS — M54.41 CHRONIC BILATERAL LOW BACK PAIN WITH BILATERAL SCIATICA: ICD-10-CM

## 2023-11-18 DIAGNOSIS — G89.29 CHRONIC BILATERAL LOW BACK PAIN WITH BILATERAL SCIATICA: ICD-10-CM

## 2023-11-18 PROCEDURE — 72100 X-RAY EXAM L-S SPINE 2/3 VWS: CPT

## 2023-11-21 ENCOUNTER — HOSPITAL ENCOUNTER (OUTPATIENT)
Dept: LAB | Facility: MEDICAL CENTER | Age: 53
End: 2023-11-21
Payer: COMMERCIAL

## 2023-11-21 DIAGNOSIS — E66.9 OBESITY (BMI 30-39.9): ICD-10-CM

## 2023-11-21 DIAGNOSIS — M54.41 CHRONIC BILATERAL LOW BACK PAIN WITH BILATERAL SCIATICA: ICD-10-CM

## 2023-11-21 DIAGNOSIS — G89.29 CHRONIC BILATERAL LOW BACK PAIN WITH BILATERAL SCIATICA: ICD-10-CM

## 2023-11-21 DIAGNOSIS — R73.03 PREDIABETES: ICD-10-CM

## 2023-11-21 DIAGNOSIS — Z13.6 SCREENING FOR CARDIOVASCULAR CONDITION: ICD-10-CM

## 2023-11-21 DIAGNOSIS — M54.42 CHRONIC BILATERAL LOW BACK PAIN WITH BILATERAL SCIATICA: ICD-10-CM

## 2023-11-21 LAB
ALBUMIN SERPL BCP-MCNC: 4.5 G/DL (ref 3.2–4.9)
ALBUMIN/GLOB SERPL: 1.2 G/DL
ALP SERPL-CCNC: 112 U/L (ref 30–99)
ALT SERPL-CCNC: 15 U/L (ref 2–50)
ANION GAP SERPL CALC-SCNC: 11 MMOL/L (ref 7–16)
AST SERPL-CCNC: 16 U/L (ref 12–45)
BILIRUB SERPL-MCNC: 0.3 MG/DL (ref 0.1–1.5)
BUN SERPL-MCNC: 14 MG/DL (ref 8–22)
CALCIUM ALBUM COR SERPL-MCNC: 9 MG/DL (ref 8.5–10.5)
CALCIUM SERPL-MCNC: 9.4 MG/DL (ref 8.5–10.5)
CHLORIDE SERPL-SCNC: 102 MMOL/L (ref 96–112)
CHOLEST SERPL-MCNC: 180 MG/DL (ref 100–199)
CO2 SERPL-SCNC: 27 MMOL/L (ref 20–33)
CREAT SERPL-MCNC: 0.77 MG/DL (ref 0.5–1.4)
EST. AVERAGE GLUCOSE BLD GHB EST-MCNC: 123 MG/DL
FASTING STATUS PATIENT QL REPORTED: NORMAL
GFR SERPLBLD CREATININE-BSD FMLA CKD-EPI: 93 ML/MIN/1.73 M 2
GLOBULIN SER CALC-MCNC: 3.9 G/DL (ref 1.9–3.5)
GLUCOSE SERPL-MCNC: 89 MG/DL (ref 65–99)
HBA1C MFR BLD: 5.9 % (ref 4–5.6)
HDLC SERPL-MCNC: 56 MG/DL
LDLC SERPL CALC-MCNC: 107 MG/DL
POTASSIUM SERPL-SCNC: 4.2 MMOL/L (ref 3.6–5.5)
PROT SERPL-MCNC: 8.4 G/DL (ref 6–8.2)
SODIUM SERPL-SCNC: 140 MMOL/L (ref 135–145)
TRIGL SERPL-MCNC: 86 MG/DL (ref 0–149)

## 2023-11-21 PROCEDURE — 83036 HEMOGLOBIN GLYCOSYLATED A1C: CPT

## 2023-11-21 PROCEDURE — 36415 COLL VENOUS BLD VENIPUNCTURE: CPT

## 2023-11-21 PROCEDURE — 80061 LIPID PANEL: CPT

## 2023-11-21 PROCEDURE — 80053 COMPREHEN METABOLIC PANEL: CPT

## 2023-11-27 LAB
APOB+LDLR+PCSK9 GENE MUT ANL BLD/T: NOT DETECTED
BRCA1+BRCA2 DEL+DUP + FULL MUT ANL BLD/T: NOT DETECTED
MLH1+MSH2+MSH6+PMS2 GN DEL+DUP+FUL M: NOT DETECTED

## 2023-11-28 NOTE — RESULT ENCOUNTER NOTE
We have reviewed your [facility] [place] [facility] results. They are NEGATIVE for variants associated with hereditary breast and ovarian cancer, narvaez syndrome, and familial hypercholesterolemia. This means you are at average risk for these conditions. Please follow-up with your primary care provider or the [facility] [place] [facility] team at [phone] if you have any questions.

## 2023-12-04 ENCOUNTER — HOSPITAL ENCOUNTER (OUTPATIENT)
Dept: RADIOLOGY | Facility: MEDICAL CENTER | Age: 53
End: 2023-12-04
Payer: COMMERCIAL

## 2023-12-04 DIAGNOSIS — Z12.31 ENCOUNTER FOR SCREENING MAMMOGRAM FOR MALIGNANT NEOPLASM OF BREAST: ICD-10-CM

## 2023-12-04 PROCEDURE — 77063 BREAST TOMOSYNTHESIS BI: CPT

## 2023-12-08 DIAGNOSIS — M54.41 CHRONIC BILATERAL LOW BACK PAIN WITH BILATERAL SCIATICA: ICD-10-CM

## 2023-12-08 DIAGNOSIS — M54.42 CHRONIC BILATERAL LOW BACK PAIN WITH BILATERAL SCIATICA: ICD-10-CM

## 2023-12-08 DIAGNOSIS — G89.29 CHRONIC BILATERAL LOW BACK PAIN WITH BILATERAL SCIATICA: ICD-10-CM

## 2023-12-08 RX ORDER — MELOXICAM 15 MG/1
15 TABLET ORAL DAILY
Qty: 30 TABLET | Refills: 0 | Status: SHIPPED | OUTPATIENT
Start: 2023-12-08

## 2023-12-21 ENCOUNTER — APPOINTMENT (OUTPATIENT)
Dept: PHYSICAL MEDICINE AND REHAB | Facility: MEDICAL CENTER | Age: 53
End: 2023-12-21
Payer: COMMERCIAL

## 2024-01-12 ENCOUNTER — OFFICE VISIT (OUTPATIENT)
Dept: MEDICAL GROUP | Facility: PHYSICIAN GROUP | Age: 54
End: 2024-01-12
Payer: COMMERCIAL

## 2024-01-12 VITALS
BODY MASS INDEX: 32.71 KG/M2 | DIASTOLIC BLOOD PRESSURE: 80 MMHG | HEIGHT: 63 IN | HEART RATE: 72 BPM | WEIGHT: 184.6 LBS | OXYGEN SATURATION: 96 % | SYSTOLIC BLOOD PRESSURE: 126 MMHG | TEMPERATURE: 97.1 F

## 2024-01-12 DIAGNOSIS — G89.29 CHRONIC BILATERAL LOW BACK PAIN WITH BILATERAL SCIATICA: ICD-10-CM

## 2024-01-12 DIAGNOSIS — E66.9 OBESITY (BMI 30-39.9): ICD-10-CM

## 2024-01-12 DIAGNOSIS — M54.42 CHRONIC BILATERAL LOW BACK PAIN WITH BILATERAL SCIATICA: ICD-10-CM

## 2024-01-12 DIAGNOSIS — M54.41 CHRONIC BILATERAL LOW BACK PAIN WITH BILATERAL SCIATICA: ICD-10-CM

## 2024-01-12 DIAGNOSIS — R73.03 PREDIABETES: ICD-10-CM

## 2024-01-12 PROCEDURE — 3074F SYST BP LT 130 MM HG: CPT

## 2024-01-12 PROCEDURE — 99214 OFFICE O/P EST MOD 30 MIN: CPT

## 2024-01-12 PROCEDURE — 3079F DIAST BP 80-89 MM HG: CPT

## 2024-01-12 ASSESSMENT — PATIENT HEALTH QUESTIONNAIRE - PHQ9: CLINICAL INTERPRETATION OF PHQ2 SCORE: 0

## 2024-01-12 ASSESSMENT — ENCOUNTER SYMPTOMS
NAUSEA: 0
BLURRED VISION: 0
MYALGIAS: 0
COUGH: 0
VOMITING: 0
WEIGHT LOSS: 0
FEVER: 0
WEAKNESS: 0
CONSTIPATION: 0
SHORTNESS OF BREATH: 0
ABDOMINAL PAIN: 0
DIZZINESS: 0
CHILLS: 0
DIARRHEA: 0
HEADACHES: 0

## 2024-01-12 NOTE — ASSESSMENT & PLAN NOTE
-Chronic, uncontrolled-recommend keeping upcoming appointment with physiatry as discussed  -Walking is the best activity for low back pain.  Prolonged immobilization may worsen condition. No heavy lifting, bending or twisting  -Okay to use Ice/heat 20 min on/off cycles as needed for discomfort  -OTC pain relievers as directed: Acetaminophen 500 mg every 4-6 hours if needed and Meloxicam 15 mg daily if needed for pain.   Flexeril 5 mg TID for muscle spasm  Patient advised to proceed to the Emergency Room if she experiences the following symptoms: increasing pain, or pain with fever, leg weakness, loss of bowel/urine control, or new or progressive numbness/tingling in legs. Patient understands and agrees.

## 2024-01-12 NOTE — ASSESSMENT & PLAN NOTE
Chronic condition, uncontrolled  -I agree with regimen as directed by [facility], she has no contraindications which would inhibit her from this regimen.  She does have prediabetes and would benefit from the metformin.  She does not have a history of high blood pressure or cardiac concerns therefore phentermine would be appropriate.  - Recommend healthy lifestyle as discussed  -Recommend aiming to achieve at least 150 minutes of moderate intensity exercise weekly including 3 days of strength training and 3 days of cardiovascular training.  Recommend getting at least 100 to 140 g of protein per day.  Recommend caloric intake of at least 1700 to 1800 ezekiel/day.  Recommend intermittent fasting 12 to 16 hours/day.  Recommend at least 64 to 100 ounces of water per day

## 2024-01-12 NOTE — PROGRESS NOTES
Subjective:     CC: Lab results and weight loss    HPI:   Shanae presents today with    Problem   Chronic Low Back Pain    Onset: 1 year, atraumatic  Location low back  Radiation right side>left, pain radiates down legs  Duration Constant  Character ache  Alleviated by movement  Exacerbated by worse in morning or and at bedtime, standing for prolonged periods of time, sitting also aggravates  Associated symptoms none  She has upcoming appointment with physiatry which I am going to encourage her to keep.  For now she will continue to take meloxicam 15 mg daily if needed and Flexeril 5 mg 3 times daily if needed.    Recent imaging in November 2023 shows the following:  Transitional thoracolumbar and lumbosacral vertebrae considered T12 and L5 in this dictation.     2.  Mild degenerative changes.     3.  Sclerotic irregular SI joints. Erosive changes not excluded.     Prediabetes    Chronic condition, active  -Patient eats mostly healthy and exercises 2-3 times per week in the gym  -Denies polyuria, polydipsia, or polyphagia, no acute   Lab Results   Component Value Date/Time    HBA1C 5.9 (H) 11/21/2023 07:05 AM    HBA1C 6.0 (A) 09/06/2022 04:00 PM        Obesity (Bmi 30-39.9)    This is a chronic condition.   Max weight: 190  Current weight: 184-she is up 5 pounds since last visit a few months ago  BMI: 32.70  Diet: Mostly healthy  Exercise: 2-3 times per week, goes to gym  Goal Weight: 150  She is working with [facility] on a weight loss plan at this time.  She has not started medication yet.  Medications proposed include metformin, phentermine, and Contrave.             ROS:  Review of Systems   Constitutional:  Negative for chills, fever, malaise/fatigue and weight loss.   Eyes:  Negative for blurred vision.   Respiratory:  Negative for cough and shortness of breath.    Cardiovascular:  Negative for chest pain.   Gastrointestinal:  Negative for abdominal pain, constipation, diarrhea, nausea and vomiting.  "  Musculoskeletal:  Negative for myalgias.   Neurological:  Negative for dizziness, weakness and headaches.       Objective:     Exam:  /80 (BP Location: Left arm, Patient Position: Sitting, BP Cuff Size: Adult)   Pulse 72   Temp 36.2 °C (97.1 °F) (Temporal)   Ht 1.6 m (5' 3\")   Wt 83.7 kg (184 lb 9.6 oz)   LMP 03/17/2021 (Within Days)   SpO2 96%   BMI 32.70 kg/m²  Body mass index is 32.7 kg/m².    Physical Exam  Constitutional:       General: She is not in acute distress.     Appearance: Normal appearance. She is not ill-appearing or toxic-appearing.   HENT:      Head: Normocephalic.   Eyes:      Conjunctiva/sclera: Conjunctivae normal.   Pulmonary:      Effort: Pulmonary effort is normal.   Skin:     General: Skin is warm and dry.   Neurological:      General: No focal deficit present.      Mental Status: She is alert and oriented to person, place, and time.   Psychiatric:         Mood and Affect: Mood normal.         Behavior: Behavior normal.         Labs:   Lab Results   Component Value Date/Time    CHOLSTRLTOT 180 11/21/2023 07:05 AM     (H) 11/21/2023 07:05 AM    HDL 56 11/21/2023 07:05 AM    TRIGLYCERIDE 86 11/21/2023 07:05 AM       Lab Results   Component Value Date/Time    SODIUM 140 11/21/2023 07:05 AM    POTASSIUM 4.2 11/21/2023 07:05 AM    CHLORIDE 102 11/21/2023 07:05 AM    CO2 27 11/21/2023 07:05 AM    GLUCOSE 89 11/21/2023 07:05 AM    BUN 14 11/21/2023 07:05 AM    CREATININE 0.77 11/21/2023 07:05 AM    BUNCREATRAT 13 12/14/2018 11:31 AM     Lab Results   Component Value Date/Time    ALKPHOSPHAT 112 (H) 11/21/2023 07:05 AM    ASTSGOT 16 11/21/2023 07:05 AM    ALTSGPT 15 11/21/2023 07:05 AM    TBILIRUBIN 0.3 11/21/2023 07:05 AM      Lab Results   Component Value Date/Time    HBA1C 5.9 (H) 11/21/2023 07:05 AM    HBA1C 6.0 (A) 09/06/2022 04:00 PM    HBA1C 6.9 (H) 04/02/2021 02:59 PM       Assessment & Plan: Medical Decision Making     53 y.o. female with the following -     Problem " List Items Addressed This Visit       Obesity (BMI 30-39.9)     Chronic condition, uncontrolled  -I agree with regimen as directed by [facility], she has no contraindications which would inhibit her from this regimen.  She does have prediabetes and would benefit from the metformin.  She does not have a history of high blood pressure or cardiac concerns therefore phentermine would be appropriate.  - Recommend healthy lifestyle as discussed  -Recommend aiming to achieve at least 150 minutes of moderate intensity exercise weekly including 3 days of strength training and 3 days of cardiovascular training.  Recommend getting at least 100 to 140 g of protein per day.  Recommend caloric intake of at least 1700 to 1800 ezekiel/day.  Recommend intermittent fasting 12 to 16 hours/day.  Recommend at least 64 to 100 ounces of water per day         Relevant Orders    Patient identified as having weight management issue.  Appropriate orders and counseling given.    Prediabetes     Condition stable and improved recommend continued diligent efforts towards healthy lifestyle as discussed  Recommend follow-up in 6 months for repeat A1c         Chronic low back pain     -Chronic, uncontrolled-recommend keeping upcoming appointment with physiatry as discussed  -Walking is the best activity for low back pain.  Prolonged immobilization may worsen condition. No heavy lifting, bending or twisting  -Okay to use Ice/heat 20 min on/off cycles as needed for discomfort  -OTC pain relievers as directed: Acetaminophen 500 mg every 4-6 hours if needed and Meloxicam 15 mg daily if needed for pain.   Flexeril 5 mg TID for muscle spasm  Patient advised to proceed to the Emergency Room if she experiences the following symptoms: increasing pain, or pain with fever, leg weakness, loss of bowel/urine control, or new or progressive numbness/tingling in legs. Patient understands and agrees.              Differential  diagnosis, natural history, supportive care, and indications for immediate follow-up discussed.  Shared decision making approach utilized, and patient is amendable with plan of care.  Patient understands to return to clinic or go to the emergency department if symptoms worsen. All questions and concerns addressed to the best of my knowledge.    Return in about 6 months (around 7/12/2024), or if symptoms worsen or fail to improve.    Please note that this dictation was created using voice recognition software. I have made every reasonable attempt to correct obvious errors, but I expect that there are errors of grammar and possibly content that I did not discover before finalizing the note.

## 2024-01-12 NOTE — ASSESSMENT & PLAN NOTE
Condition stable and improved recommend continued diligent efforts towards healthy lifestyle as discussed  Recommend follow-up in 6 months for repeat A1c

## 2024-01-15 ENCOUNTER — OFFICE VISIT (OUTPATIENT)
Dept: PHYSICAL MEDICINE AND REHAB | Facility: MEDICAL CENTER | Age: 54
End: 2024-01-15
Payer: COMMERCIAL

## 2024-01-15 VITALS
OXYGEN SATURATION: 97 % | TEMPERATURE: 97 F | SYSTOLIC BLOOD PRESSURE: 112 MMHG | HEART RATE: 88 BPM | WEIGHT: 179.9 LBS | DIASTOLIC BLOOD PRESSURE: 72 MMHG | BODY MASS INDEX: 31.88 KG/M2 | HEIGHT: 63 IN

## 2024-01-15 DIAGNOSIS — G89.29 CHRONIC RIGHT-SIDED LOW BACK PAIN WITHOUT SCIATICA: ICD-10-CM

## 2024-01-15 DIAGNOSIS — M53.3 SACROILIAC JOINT DYSFUNCTION OF RIGHT SIDE: ICD-10-CM

## 2024-01-15 DIAGNOSIS — M47.816 LUMBAR SPONDYLOSIS: ICD-10-CM

## 2024-01-15 DIAGNOSIS — M54.50 CHRONIC RIGHT-SIDED LOW BACK PAIN WITHOUT SCIATICA: ICD-10-CM

## 2024-01-15 DIAGNOSIS — E66.9 OBESITY (BMI 30-39.9): ICD-10-CM

## 2024-01-15 DIAGNOSIS — Z71.82 EXERCISE COUNSELING: ICD-10-CM

## 2024-01-15 PROCEDURE — 99204 OFFICE O/P NEW MOD 45 MIN: CPT | Performed by: PHYSICAL MEDICINE & REHABILITATION

## 2024-01-15 PROCEDURE — 3078F DIAST BP <80 MM HG: CPT | Performed by: PHYSICAL MEDICINE & REHABILITATION

## 2024-01-15 PROCEDURE — 1126F AMNT PAIN NOTED NONE PRSNT: CPT | Performed by: PHYSICAL MEDICINE & REHABILITATION

## 2024-01-15 PROCEDURE — 3074F SYST BP LT 130 MM HG: CPT | Performed by: PHYSICAL MEDICINE & REHABILITATION

## 2024-01-15 ASSESSMENT — PAIN SCALES - GENERAL: PAINLEVEL: NO PAIN

## 2024-01-15 ASSESSMENT — PATIENT HEALTH QUESTIONNAIRE - PHQ9: CLINICAL INTERPRETATION OF PHQ2 SCORE: 0

## 2024-01-15 ASSESSMENT — ENCOUNTER SYMPTOMS: BACK PAIN: 1

## 2024-01-15 NOTE — PROGRESS NOTES
New patient note    Interventional spine and Pain  Physiatry (physical medicine and  Rehabilitation)     Date of service: See epic    Chief complaint:   Chief Complaint   Patient presents with    New Patient     Back pain        Referring provider: Brannon Sam D.N.P.     HISTORY    HPI: Shanae Lim 53 y.o.  who presents today with Diagnoses of Sacroiliac joint dysfunction of right side, Chronic right-sided low back pain without sciatica, Lumbar spondylosis, BMI 30-39.9, and Exercise counseling were pertinent to this visit.    Back Pain  This is a chronic problem. The current episode started more than 1 year ago. The problem occurs constantly. The problem has been gradually worsening since onset. The pain is present in the gluteal and sacro-iliac (right worse than left). The pain does not radiate. The pain is at a severity of 6/10. The symptoms are aggravated by bending, lying down, standing and twisting (lifting). She has tried analgesics, heat, ice, muscle relaxant, walking, NSAIDs, chiropractic manipulation and home exercises for the symptoms. The treatment provided mild relief.       Previously seen by Dr. Ángel Partida as well as from Dr. Gleason.  Her visits with Dr. Gleason she reports for many years ago.       Medical records review:  I reviewed the note from the referring provider Brannon Sam D.N.P.         ROS:   Red Flags ROS:   Fever, Chills, Sweats: Denies  Involuntary Weight Loss: Denies  Bladder Incontinence: Denies  Bowel Incontinence: denies  Saddle Anesthesia: Denies    All other systems reviewed and negative.       PMHx:   Past Medical History:   Diagnosis Date    Diabetes (HCC)          Current Outpatient Medications on File Prior to Visit   Medication Sig Dispense Refill    meloxicam (MOBIC) 15 MG tablet TAKE 1 TABLET BY MOUTH EVERY DAY 30 Tablet 0    cyclobenzaprine (FLEXERIL) 5 mg tablet Take 1 Tablet by mouth 3 times a day as needed for Muscle Spasms. 30 Tablet 0     Azelastine-Fluticasone 137-50 MCG/ACT Suspension Administer 1 Spray into affected nostril(S) every day. 23 g 2    sumatriptan (IMITREX) 100 MG tablet TAKE 1 TABLET BY MOUTH AT ONSET OF HEADACHE, THEN TAKE AS NEEDED. MAX 1 TAB PER 24 HOURS 10 Tablet 11    Multiple Vitamin (MULTIVITAMIN PO) Take 2 Each by mouth every day. Smartypants Gummies      Multiple Vitamins-Minerals (OCUVITE-LUTEIN) Tab Take 1 Tablet by mouth every day.       No current facility-administered medications on file prior to visit.        PSHx:   Past Surgical History:   Procedure Laterality Date    DENTAL EXTRACTION(S)         Family history   Family History   Problem Relation Age of Onset    No Known Problems Mother     No Known Problems Father     Diabetes Maternal Grandfather         Type II    Heart Disease Maternal Grandfather     No Known Problems Daughter     No Known Problems Daughter          Medications: reviewed on epic.   Outpatient Medications Marked as Taking for the 1/15/24 encounter (Office Visit) with Mark Andre M.D.   Medication Sig Dispense Refill    meloxicam (MOBIC) 15 MG tablet TAKE 1 TABLET BY MOUTH EVERY DAY 30 Tablet 0    cyclobenzaprine (FLEXERIL) 5 mg tablet Take 1 Tablet by mouth 3 times a day as needed for Muscle Spasms. 30 Tablet 0    Azelastine-Fluticasone 137-50 MCG/ACT Suspension Administer 1 Spray into affected nostril(S) every day. 23 g 2    sumatriptan (IMITREX) 100 MG tablet TAKE 1 TABLET BY MOUTH AT ONSET OF HEADACHE, THEN TAKE AS NEEDED. MAX 1 TAB PER 24 HOURS 10 Tablet 11    Multiple Vitamin (MULTIVITAMIN PO) Take 2 Each by mouth every day. Smartypants Gummies      Multiple Vitamins-Minerals (OCUVITE-LUTEIN) Tab Take 1 Tablet by mouth every day.          Allergies:   No Known Allergies    Social Hx:   Social History     Socioeconomic History    Marital status:      Spouse name: Not on file    Number of children: Not on file    Years of education: Not on file    Highest education level: Bachelor's  degree (e.g., BA, AB, BS)   Occupational History    Not on file   Tobacco Use    Smoking status: Never    Smokeless tobacco: Never   Vaping Use    Vaping Use: Never used   Substance and Sexual Activity    Alcohol use: Yes     Comment: Socially. No Binge-Drinking.      Drug use: No    Sexual activity: Yes     Partners: Male     Comment: .  at [facility].   Other Topics Concern    Not on file   Social History Narrative    Not on file     Social Determinants of Health     Financial Resource Strain: Low Risk  (11/9/2023)    Overall Financial Resource Strain (CARDIA)     Difficulty of Paying Living Expenses: Not hard at all   Food Insecurity: No Food Insecurity (11/9/2023)    Hunger Vital Sign     Worried About Running Out of Food in the Last Year: Never true     Ran Out of Food in the Last Year: Never true   Transportation Needs: No Transportation Needs (11/9/2023)    PRAPARE - Transportation     Lack of Transportation (Medical): No     Lack of Transportation (Non-Medical): No   Physical Activity: Insufficiently Active (11/9/2023)    Exercise Vital Sign     Days of Exercise per Week: 2 days     Minutes of Exercise per Session: 60 min   Stress: No Stress Concern Present (11/9/2023)    [employer] [place] [employer] - Occupational Stress Questionnaire     Feeling of Stress : Only a little   Social Connections: Unknown (11/9/2023)    Social Connection and Isolation Panel [NHANES]     Frequency of Communication with Friends and Family: Once a week     Frequency of Social Gatherings with Friends and Family: Patient refused     Attends [employer] Services: Never     Active Member of Clubs or Organizations: No     Attends Club or Organization Meetings: Never     Marital Status:    Intimate Partner Violence: Not on file   Housing Stability: Low Risk  (11/9/2023)    Housing Stability Vital Sign     Unable to Pay for Housing in the Last Year: No     Number of Places Lived in the Last Year: 1     " Unstable Housing in the Last Year: No         EXAMINATION     Physical Exam:   Vitals: /72 (BP Location: Right arm, Patient Position: Sitting, BP Cuff Size: Adult)   Pulse 88   Temp 36.1 °C (97 °F) (Temporal)   Ht 1.6 m (5' 3\")   Wt 81.6 kg (179 lb 14.3 oz)   SpO2 97%     Constitutional:   Body Habitus: Body mass index is 31.87 kg/m².  Cooperation: Fully cooperates with exam  Appearance: Well-groomed, well-nourished, not disheveled     Eyes: No scleral icterus to suggest severe liver disease, no proptosis to suggest severe hyperthyroid    ENT -no obvious auditory deficits, no obvious tongue lesions, tongue midline, no facial droop     Skin -no rashes or lesions noted     Respiratory-  breathing comfortable on room air, no audible wheezing    Cardiovascular- capillary refills less than 2 seconds.     Psychiatric- alert and oriented ×3. Normal affect.     Gait - normal gait, no use of ambulatory device, nonantalgic.     Musculoskeletal and Neuro -     Sacroiliac joint maneuvers  Thigh thrust positive right, negative left    Dom's positive right, negative left    Gaenslen's positive right, negative left    SI joint distraction positive right, negative left    SI joint compression positive right, negative left    Tenderness to palpation of sacroiliac joint: positive right, negative left           Thoracic/Lumbar Spine/Sacral Spine/Hips   Inspection: No evidence of atrophy in bilateral lower extremities throughout     ROM: decreased active range of motion with flexion, lateral flexion, and rotation bilaterally.   There is decreased active range of motion with lumbar extension with pain.    There is pain with facet loading maneuver (extension rotation) with axial low back pain on the RIGHT side(s)    Palpation:   No tenderness to palpation in midline at T1-T12 levels. No tenderness to palpation in the left and right of the midline T1-L5, NEGATIVE for tenderness to palpation to the para-midline region in the " "lower lumbar levels.  palpation in hip or over the gluteus medius tendon insertion: negative bilaterally      Lumbar spine Special tests  Neuro tension  Straight leg test negative bilaterally    Slump test negative bilaterally      HIP  FAIR test negative bilaterally        Key points for the international standards for neurological classification of spinal cord injury (ISNCSCI) to light touch.     Dermatome R L                                      L2 2 2   L3 2 2   L4 2 2   L5 2 2   S1 2 2   S2 2 2       Motor Exam Lower Extremities    ? Myotome R L   Hip flexion L2 5 5   Knee extension L3 5 5   Ankle dorsiflexion L4 5 5   Toe extension L5 5 5   Ankle plantarflexion S1 5 5         Reflexes  ?  R L                Patella  2+ 2+   Achilles   2+ 2+       Babinski sign negative bilaterally   Clonus of the ankle negative bilaterally       MEDICAL DECISION MAKING    Medical records review: see under HPI section.     DATA    Labs:   Lab Results   Component Value Date/Time    SODIUM 140 11/21/2023 07:05 AM    POTASSIUM 4.2 11/21/2023 07:05 AM    CHLORIDE 102 11/21/2023 07:05 AM    CO2 27 11/21/2023 07:05 AM    ANION 11.0 11/21/2023 07:05 AM    GLUCOSE 89 11/21/2023 07:05 AM    BUN 14 11/21/2023 07:05 AM    CREATININE 0.77 11/21/2023 07:05 AM    CALCIUM 9.4 11/21/2023 07:05 AM    ASTSGOT 16 11/21/2023 07:05 AM    ALTSGPT 15 11/21/2023 07:05 AM    TBILIRUBIN 0.3 11/21/2023 07:05 AM    ALBUMIN 4.5 11/21/2023 07:05 AM    TOTPROTEIN 8.4 (H) 11/21/2023 07:05 AM    GLOBULIN 3.9 (H) 11/21/2023 07:05 AM    AGRATIO 1.2 11/21/2023 07:05 AM   ]    No results found for: \"PROTHROMBTM\", \"INR\"     No results found for: \"WBC\", \"RBC\", \"HEMOGLOBIN\", \"HEMATOCRIT\", \"MCV\", \"MCH\", \"MCHC\", \"MPV\", \"NEUTSPOLYS\", \"LYMPHOCYTES\", \"MONOCYTES\", \"EOSINOPHILS\", \"BASOPHILS\", \"HYPOCHROMIA\", \"ANISOCYTOSIS\"     Lab Results   Component Value Date/Time    HBA1C 5.9 (H) 11/21/2023 07:05 AM        Imaging:   I personally reviewed following images, these are my " reads    X-ray lumbar spine 11/18/2023  Transitional anatomy.  Facet arthropathy and degenerative changes.  Degenerative changes in the sacroiliac joints.        IMAGING radiology reads. I reviewed the following radiology reads                                                                                        Results for orders placed during the hospital encounter of 11/18/23    DX-LUMBAR SPINE-2 OR 3 VIEWS    Impression  1.  Transitional thoracolumbar and lumbosacral vertebrae considered T12 and L5 in this dictation.    2.  Mild degenerative changes.    3.  Sclerotic irregular SI joints. Erosive changes not excluded.                   Results for orders placed during the hospital encounter of 10/15/22    DX-WRIST-LIMITED 2- RIGHT    Impression  No evidence of fracture or dislocation.        Diagnosis   Visit Diagnoses     ICD-10-CM   1. Sacroiliac joint dysfunction of right side  M53.3   2. Chronic right-sided low back pain without sciatica  M54.50    G89.29   3. Lumbar spondylosis  M47.816   4. BMI 30-39.9  E66.9   5. Exercise counseling  Z71.82           ASSESSMENT AND PLAN:  Shanae Blanca Lim 53 y.o. female      Shanae was seen today for new patient.    Diagnoses and all orders for this visit:    Sacroiliac joint dysfunction of right side  -     Referral to Chiropractic  -     Referral to Physical Therapy    Chronic right-sided low back pain without sciatica  -     Referral to Chiropractic  -     Referral to Physical Therapy    Lumbar spondylosis  -     Referral to Chiropractic  -     Referral to Physical Therapy    BMI 30-39.9  -     Referral to Chiropractic  -     Referral to Physical Therapy    Exercise counseling  -     Referral to Chiropractic  -     Referral to Physical Therapy      The patient has no red flag signs on today's exam.  Specifically the patient has no saddle anesthesia, bowel incontinence, bladder incontinence.  We discussed emergency precautions. The patient understands  emergency precautions.       Physical therapy: I ordered physical therapy to focus on strengthening and stretching.     home exercise program: I provided the patient with a strengthening and stretching with a home exercise program     Diagnostic workup: As above    Medications:    Acetaminophen up to 1000 mg 3 times daily as needed not to exceed 3000 mg per 24-hours.    Interventional program: I would consider the patient for an right sacroiliac joint injection depending on the results of the above conservative treatments        Follow-up: After the above diagnostic studies           Please note that this dictation was created using voice recognition software. I have made every reasonable attempt to correct obvious errors but there may be errors of grammar and content that I may have overlooked prior to finalization of this note.      Mark Andre MD  Physical Medicine and Rehabilitation  Interventional Spine and Sports Physiatry  [facility] Brannon Sam D.N.P.

## 2024-01-17 DIAGNOSIS — M54.42 CHRONIC BILATERAL LOW BACK PAIN WITH BILATERAL SCIATICA: ICD-10-CM

## 2024-01-17 DIAGNOSIS — M54.41 CHRONIC BILATERAL LOW BACK PAIN WITH BILATERAL SCIATICA: ICD-10-CM

## 2024-01-17 DIAGNOSIS — G89.29 CHRONIC BILATERAL LOW BACK PAIN WITH BILATERAL SCIATICA: ICD-10-CM

## 2024-02-29 ENCOUNTER — APPOINTMENT (OUTPATIENT)
Dept: PHYSICAL THERAPY | Facility: REHABILITATION | Age: 54
End: 2024-02-29
Payer: COMMERCIAL

## 2024-02-29 ENCOUNTER — OFFICE VISIT (OUTPATIENT)
Dept: MEDICAL GROUP | Facility: PHYSICIAN GROUP | Age: 54
End: 2024-02-29
Payer: COMMERCIAL

## 2024-02-29 VITALS
OXYGEN SATURATION: 98 % | BODY MASS INDEX: 31.15 KG/M2 | SYSTOLIC BLOOD PRESSURE: 110 MMHG | HEIGHT: 63 IN | TEMPERATURE: 97.7 F | HEART RATE: 78 BPM | DIASTOLIC BLOOD PRESSURE: 72 MMHG | WEIGHT: 175.8 LBS

## 2024-02-29 DIAGNOSIS — H61.22 IMPACTED CERUMEN OF LEFT EAR: ICD-10-CM

## 2024-02-29 PROCEDURE — 3074F SYST BP LT 130 MM HG: CPT | Performed by: FAMILY MEDICINE

## 2024-02-29 PROCEDURE — 99213 OFFICE O/P EST LOW 20 MIN: CPT | Performed by: FAMILY MEDICINE

## 2024-02-29 PROCEDURE — 3078F DIAST BP <80 MM HG: CPT | Performed by: FAMILY MEDICINE

## 2024-02-29 RX ORDER — AZELASTINE HYDROCHLORIDE 137 UG/1
SPRAY, METERED NASAL
COMMUNITY
Start: 2023-12-19

## 2024-02-29 RX ORDER — IPRATROPIUM BROMIDE 21 UG/1
SPRAY, METERED NASAL
COMMUNITY
Start: 2023-12-19

## 2024-02-29 RX ORDER — METFORMIN HYDROCHLORIDE 500 MG/1
TABLET, EXTENDED RELEASE ORAL
COMMUNITY
Start: 2024-02-15

## 2024-02-29 NOTE — PROCEDURES
Procedure: Cerumen Removal  Risks and benefits of procedure discussed with patient.  Cerumen removed with lavage by the MA. Patient tolerated the procedure well    Post procedure exam with clear canal and normal TM.    Pt educated about proper care of ear canal. Q-tip cleaning discouraged, use of debrox and warm water lavage discussed.

## 2024-02-29 NOTE — PROGRESS NOTES
"Verbal consent was acquired by the patient to use [employer] ambient listening note generation during this visit     Subjective:     HPI:   History of Present Illness  The patient is a 53-year-old female who presents for evaluation of left ear symptoms.    On 02/15/2024, she went to the [facility] because her left ear was muffled and thought she had wax in it. She was told that her right ear had some fluid and her left ear had a big ball of wax. They did a lavage but could not get anything out. She was sent home and told to buy Debrox. She used it over the weekend and did a syringe at home but very little came out. Ever since then, her hearing in her left ear has been wonky. She can still hear but it is muffled and bothersome. She has to always pop her ears. She denies any pain. She denies any chest pain or trouble breathing. She denies any fevers or chills.    Objective:     Exam:  /72 (BP Location: Left arm, Patient Position: Sitting, BP Cuff Size: Adult)   Pulse 78   Temp 36.5 °C (97.7 °F) (Temporal)   Ht 1.6 m (5' 3\")   Wt 79.7 kg (175 lb 12.8 oz)   LMP 03/17/2021 (Within Days)   SpO2 98%   BMI 31.14 kg/m²  Body mass index is 31.14 kg/m².    Physical Exam  Constitutional:       Appearance: Normal appearance.   HENT:      Right Ear: Tympanic membrane, ear canal and external ear normal.      Left Ear: There is impacted cerumen.   Cardiovascular:      Rate and Rhythm: Normal rate and regular rhythm.      Heart sounds: Normal heart sounds.   Pulmonary:      Effort: Pulmonary effort is normal.      Breath sounds: Normal breath sounds.   Musculoskeletal:      Cervical back: Normal range of motion and neck supple.   Lymphadenopathy:      Cervical: No cervical adenopathy.   Neurological:      Mental Status: She is alert.             Results      Assessment & Plan:     1. Impacted cerumen of left ear            Assessment & Plan  1. Left-sided cerumen impaction.  This is chronic " and stable. She was seen at an urgent care 2 weeks ago and they were unsuccessful at removing the earwax with lavage. Unfortunately, her symptoms have progressed and her hearing has continued to decrease. An ear lavage was performed in the office today. Please see procedure note for details.      Referral for genetic research was offered. Patient is a participant.    Please note that this dictation was created using voice recognition software. I have made every reasonable attempt to correct obvious errors, but I expect that there are errors of grammar and possibly content that I did not discover before finalizing the note.

## 2024-04-09 ENCOUNTER — OFFICE VISIT (OUTPATIENT)
Dept: URGENT CARE | Facility: PHYSICIAN GROUP | Age: 54
End: 2024-04-09
Payer: COMMERCIAL

## 2024-04-09 VITALS
BODY MASS INDEX: 30.3 KG/M2 | TEMPERATURE: 97.5 F | DIASTOLIC BLOOD PRESSURE: 60 MMHG | RESPIRATION RATE: 18 BRPM | HEART RATE: 66 BPM | WEIGHT: 171 LBS | HEIGHT: 63 IN | SYSTOLIC BLOOD PRESSURE: 104 MMHG | OXYGEN SATURATION: 99 %

## 2024-04-09 DIAGNOSIS — R42 VERTIGO: ICD-10-CM

## 2024-04-09 PROCEDURE — 3078F DIAST BP <80 MM HG: CPT | Performed by: NURSE PRACTITIONER

## 2024-04-09 PROCEDURE — 3074F SYST BP LT 130 MM HG: CPT | Performed by: NURSE PRACTITIONER

## 2024-04-09 PROCEDURE — 99213 OFFICE O/P EST LOW 20 MIN: CPT | Performed by: NURSE PRACTITIONER

## 2024-04-09 RX ORDER — DIETHYLPROPION HYDROCHLORIDE 25 MG/1
TABLET ORAL
COMMUNITY
Start: 2024-03-26

## 2024-04-09 RX ORDER — MECLIZINE HYDROCHLORIDE 25 MG/1
25 TABLET ORAL 3 TIMES DAILY PRN
Qty: 30 TABLET | Refills: 0 | Status: SHIPPED | OUTPATIENT
Start: 2024-04-09

## 2024-04-11 ENCOUNTER — APPOINTMENT (OUTPATIENT)
Dept: MEDICAL GROUP | Facility: PHYSICIAN GROUP | Age: 54
End: 2024-04-11
Payer: COMMERCIAL

## 2024-04-11 ASSESSMENT — ENCOUNTER SYMPTOMS
SPEECH CHANGE: 0
LOSS OF CONSCIOUSNESS: 0
FEVER: 0
GASTROINTESTINAL NEGATIVE: 1
DIZZINESS: 1
HEADACHES: 0
TINGLING: 0
TREMORS: 0
SENSORY CHANGE: 0
PSYCHIATRIC NEGATIVE: 1
WEAKNESS: 0
MUSCULOSKELETAL NEGATIVE: 1
FOCAL WEAKNESS: 0
CARDIOVASCULAR NEGATIVE: 1
RESPIRATORY NEGATIVE: 1
CONSTITUTIONAL NEGATIVE: 1
CHILLS: 0
EYES NEGATIVE: 1
SEIZURES: 0

## 2024-04-11 NOTE — PROGRESS NOTES
"Subjective:   Shanae Lim is a 53 y.o. female who presents for Dizziness (X3 days)      Patient presents for evaluation of vertigo which began approximately 3 days ago.  She states that the initial onset was intermittent episodes, but over the last day it has been more frequent.  She states it is worse with movement and head turning.  She does have a history of vertigo, but has not had it for couple of years.  She did try taking some Dramamine, but this has not really helped.  She does report she has taken meclizine in the past with some benefit.  She does report mild nausea, but no vomiting.        Review of Systems   Constitutional: Negative.  Negative for chills and fever.   HENT: Negative.     Eyes: Negative.    Respiratory: Negative.     Cardiovascular: Negative.    Gastrointestinal: Negative.    Genitourinary: Negative.    Musculoskeletal: Negative.    Skin: Negative.    Neurological:  Positive for dizziness. Negative for tingling, tremors, sensory change, speech change, focal weakness, seizures, loss of consciousness, weakness and headaches.   Endo/Heme/Allergies: Negative.    Psychiatric/Behavioral: Negative.     All other systems reviewed and are negative.      Medications, Allergies, and current problem list reviewed today in Epic.     Objective:     /60 (BP Location: Right arm, Patient Position: Sitting, BP Cuff Size: Adult)   Pulse 66   Temp 36.4 °C (97.5 °F) (Temporal)   Resp 18   Ht 1.6 m (5' 3\")   Wt 77.6 kg (171 lb)   SpO2 99%     Physical Exam  Vitals reviewed.   Constitutional:       General: She is not in acute distress.     Appearance: Normal appearance. She is not ill-appearing.   HENT:      Head: Normocephalic and atraumatic.      Right Ear: Tympanic membrane, ear canal and external ear normal.      Left Ear: Tympanic membrane, ear canal and external ear normal.      Nose: Nose normal.      Mouth/Throat:      Mouth: Mucous membranes are moist.      Pharynx: " Oropharynx is clear.   Eyes:      Extraocular Movements: Extraocular movements intact.      Conjunctiva/sclera: Conjunctivae normal.      Pupils: Pupils are equal, round, and reactive to light.   Cardiovascular:      Rate and Rhythm: Normal rate and regular rhythm.      Pulses: Normal pulses.      Heart sounds: Normal heart sounds.   Pulmonary:      Effort: Pulmonary effort is normal.      Breath sounds: Normal breath sounds.   Abdominal:      General: Abdomen is flat.      Palpations: Abdomen is soft.   Musculoskeletal:         General: Normal range of motion.      Cervical back: Normal range of motion and neck supple.   Skin:     General: Skin is warm and dry.      Capillary Refill: Capillary refill takes less than 2 seconds.   Neurological:      General: No focal deficit present.      Mental Status: She is alert and oriented to person, place, and time. Mental status is at baseline.      GCS: GCS eye subscore is 4. GCS verbal subscore is 5. GCS motor subscore is 6.      Cranial Nerves: Cranial nerves 2-12 are intact.      Sensory: Sensation is intact.      Motor: Motor function is intact.      Coordination: Coordination is intact.      Gait: Gait is intact.      Deep Tendon Reflexes: Reflexes are normal and symmetric.   Psychiatric:         Mood and Affect: Mood normal.         Behavior: Behavior normal.         Assessment/Plan:     Diagnosis and associated orders:     1. Vertigo  meclizine (ANTIVERT) 25 MG Tab    Referral to Physical Therapy         Comments/MDM:     Symptoms and exam findings are highly consistent with BPPV.  Meclizine prescribed. He was cautioned about driving while having the vertigo symptoms and while using the meclizine as adequately may cause some drowsiness.  We reviewed several particle repositioning maneuvers such as modified Epley, [place], and Michel-Gay. These maneuvers should be done three times daily until the patient is symptom free for 24 hours.      Instructed  to take  meclizine as prescribed. We will consider referral to physical therapy for vestibular rehabilitation if these interventions do not help after 2 weeks.            Differential diagnosis, natural history, supportive care, and indications for immediate follow-up discussed.    Advised the patient to follow-up with the primary care physician for recheck, reevaluation, and consideration of further management.    Please note that this dictation was created using voice recognition software. I have made a reasonable attempt to correct obvious errors, but I expect that there are errors of grammar and possibly content that I did not discover before finalizing the note.    This note was electronically signed by LEONIDES Larios

## 2024-05-09 ENCOUNTER — OFFICE VISIT (OUTPATIENT)
Dept: PHYSICAL MEDICINE AND REHAB | Facility: MEDICAL CENTER | Age: 54
End: 2024-05-09
Payer: COMMERCIAL

## 2024-05-09 VITALS
HEIGHT: 63 IN | OXYGEN SATURATION: 96 % | BODY MASS INDEX: 28.88 KG/M2 | SYSTOLIC BLOOD PRESSURE: 114 MMHG | TEMPERATURE: 97 F | WEIGHT: 163 LBS | DIASTOLIC BLOOD PRESSURE: 78 MMHG | HEART RATE: 80 BPM

## 2024-05-09 DIAGNOSIS — M47.816 LUMBAR SPONDYLOSIS: ICD-10-CM

## 2024-05-09 DIAGNOSIS — G89.29 CHRONIC RIGHT-SIDED LOW BACK PAIN WITHOUT SCIATICA: ICD-10-CM

## 2024-05-09 DIAGNOSIS — M53.3 SACROILIAC JOINT DYSFUNCTION OF RIGHT SIDE: ICD-10-CM

## 2024-05-09 DIAGNOSIS — M54.50 CHRONIC RIGHT-SIDED LOW BACK PAIN WITHOUT SCIATICA: ICD-10-CM

## 2024-05-09 PROCEDURE — 3074F SYST BP LT 130 MM HG: CPT | Performed by: PHYSICAL MEDICINE & REHABILITATION

## 2024-05-09 PROCEDURE — 99214 OFFICE O/P EST MOD 30 MIN: CPT | Performed by: PHYSICAL MEDICINE & REHABILITATION

## 2024-05-09 PROCEDURE — 1125F AMNT PAIN NOTED PAIN PRSNT: CPT | Performed by: PHYSICAL MEDICINE & REHABILITATION

## 2024-05-09 PROCEDURE — G2211 COMPLEX E/M VISIT ADD ON: HCPCS | Performed by: PHYSICAL MEDICINE & REHABILITATION

## 2024-05-09 PROCEDURE — 3078F DIAST BP <80 MM HG: CPT | Performed by: PHYSICAL MEDICINE & REHABILITATION

## 2024-05-09 ASSESSMENT — PAIN SCALES - GENERAL: PAINLEVEL: 3=SLIGHT PAIN

## 2024-05-09 ASSESSMENT — PATIENT HEALTH QUESTIONNAIRE - PHQ9: CLINICAL INTERPRETATION OF PHQ2 SCORE: 0

## 2024-05-09 NOTE — PROGRESS NOTES
Follow up patient note  Interventional spine and Pain  Physiatry (physical medicine and  Rehabilitation)       Chief complaint:   Chief Complaint   Patient presents with    Follow-Up     Back pain          HISTORY    Please see new patient note by Dr Andre,  for more details.     HPI  Patient identification: Shanae Lim ,  1970,   With Diagnoses of Sacroiliac joint dysfunction of right side, Chronic right-sided low back pain without sciatica, and Lumbar spondylosis were pertinent to this visit.       The patient has been doing a provider driven home exercise program and has had an improvement in pain and function.  She is improved range of motion.  She is going on hikes for approximately 2 to 3 miles at a time.  After hike she does get some aching soreness in the low back which is 2 out of 10 in intensity mild.  This is not causing functional deficits.  She denies radiating pain.  Denies numbness tingling or weakness. She does pilates.       ROS Red Flags :   Fever, Chills, Sweats: Denies  Involuntary Weight Loss: Denies  Bowel/Bladder Incontinence: Denies  Saddle Anesthesia: Denies        PMHx:   Past Medical History:   Diagnosis Date    Diabetes (HCC)        PSHx:   Past Surgical History:   Procedure Laterality Date    DENTAL EXTRACTION(S)         Family history   Family History   Problem Relation Age of Onset    No Known Problems Mother     No Known Problems Father     Diabetes Maternal Grandfather         Type II    Heart Disease Maternal Grandfather     No Known Problems Daughter     No Known Problems Daughter          Medications:   Outpatient Medications Marked as Taking for the 24 encounter (Office Visit) with Mark Andre M.D.   Medication Sig Dispense Refill    Diethylpropion HCl 25 MG Tab TAKE 1 TABLET BY MOUTH TWICE A DAY FOR 21 DAYS **NOT COVERED      metFORMIN ER (GLUCOPHAGE XR) 500 MG TABLET SR 24 HR       Multiple Vitamin (MULTIVITAMIN PO) Take 2 Each by mouth every  day. Smartypants Gummies      Multiple Vitamins-Minerals (OCUVITE-LUTEIN) Tab Take 1 Tablet by mouth every day.          Current Outpatient Medications on File Prior to Visit   Medication Sig Dispense Refill    Diethylpropion HCl 25 MG Tab TAKE 1 TABLET BY MOUTH TWICE A DAY FOR 21 DAYS **NOT COVERED      metFORMIN ER (GLUCOPHAGE XR) 500 MG TABLET SR 24 HR       Multiple Vitamin (MULTIVITAMIN PO) Take 2 Each by mouth every day. Smartypants Gummies      Multiple Vitamins-Minerals (OCUVITE-LUTEIN) Tab Take 1 Tablet by mouth every day.      meclizine (ANTIVERT) 25 MG Tab Take 1 Tablet by mouth 3 times a day as needed for Vertigo. (Patient not taking: Reported on 5/9/2024) 30 Tablet 0    Azelastine (ASTELIN) 137 MCG/SPRAY Solution INSTILL 1 SPRAY UP TO 2 TIMES A DAY AS NEEDED FOR NASAL CONGESTION (Patient not taking: Reported on 4/9/2024)      ipratropium (ATROVENT) 0.03 % Solution INSTILL 1 SPRAY IN EACH NOSTRIL UP TO 3 TIMES A DAY AS NEEDED FOR NASAL DRIP (Patient not taking: Reported on 4/9/2024)      meloxicam (MOBIC) 15 MG tablet TAKE 1 TABLET BY MOUTH EVERY DAY (Patient not taking: Reported on 4/9/2024) 30 Tablet 0    cyclobenzaprine (FLEXERIL) 5 mg tablet Take 1 Tablet by mouth 3 times a day as needed for Muscle Spasms. (Patient not taking: Reported on 4/9/2024) 30 Tablet 0    Azelastine-Fluticasone 137-50 MCG/ACT Suspension Administer 1 Spray into affected nostril(S) every day. (Patient not taking: Reported on 4/9/2024) 23 g 2    sumatriptan (IMITREX) 100 MG tablet TAKE 1 TABLET BY MOUTH AT ONSET OF HEADACHE, THEN TAKE AS NEEDED. MAX 1 TAB PER 24 HOURS (Patient not taking: Reported on 4/9/2024) 10 Tablet 11     No current facility-administered medications on file prior to visit.         Allergies:   No Known Allergies    Social Hx:   Social History     Socioeconomic History    Marital status:      Spouse name: Not on file    Number of children: Not on file    Years of education: Not on file    Highest  education level: Bachelor's degree (e.g., BA, AB, BS)   Occupational History    Not on file   Tobacco Use    Smoking status: Never    Smokeless tobacco: Never   Vaping Use    Vaping Use: Never used   Substance and Sexual Activity    Alcohol use: Yes     Comment: Socially. No Binge-Drinking.      Drug use: No    Sexual activity: Yes     Partners: Male     Comment: .  at [facility].   Other Topics Concern    Not on file   Social History Narrative    Not on file     Social Determinants of Health     Financial Resource Strain: Low Risk  (11/9/2023)    Overall Financial Resource Strain (CARDIA)     Difficulty of Paying Living Expenses: Not hard at all   Food Insecurity: No Food Insecurity (11/9/2023)    Hunger Vital Sign     Worried About Running Out of Food in the Last Year: Never true     Ran Out of Food in the Last Year: Never true   Transportation Needs: No Transportation Needs (11/9/2023)    PRAPARE - Transportation     Lack of Transportation (Medical): No     Lack of Transportation (Non-Medical): No   Physical Activity: Insufficiently Active (11/9/2023)    Exercise Vital Sign     Days of Exercise per Week: 2 days     Minutes of Exercise per Session: 60 min   Stress: No Stress Concern Present (11/9/2023)    [employer] [place] [employer] - Occupational Stress Questionnaire     Feeling of Stress : Only a little   Social Connections: Unknown (11/9/2023)    Social Connection and Isolation Panel [NHANES]     Frequency of Communication with Friends and Family: Once a week     Frequency of Social Gatherings with Friends and Family: Patient declined     Attends [employer] Services: Never     Active Member of Clubs or Organizations: No     Attends Club or Organization Meetings: Never     Marital Status:    Intimate Partner Violence: Not on file   Housing Stability: Low Risk  (11/9/2023)    Housing Stability Vital Sign     Unable to Pay for Housing in the Last Year: No     Number of Places  "Lived in the Last Year: 1     Unstable Housing in the Last Year: No         EXAMINATION     Physical Exam:   Vitals: /78 (BP Location: Left arm, Patient Position: Sitting, BP Cuff Size: Adult)   Pulse 80   Temp 36.1 °C (97 °F) (Temporal)   Ht 1.6 m (5' 3\")   Wt 73.9 kg (163 lb)   SpO2 96%     Constitutional:   Body Habitus: Body mass index is 28.87 kg/m².  Cooperation: Fully cooperates with exam  Appearance: Well-groomed no disheveled    Respiratory-  breathing comfortable on room air, no audible wheezing  Cardiovascular- capillary refills less than 2 seconds. No lower extremity edema is noted.   Psychiatric- alert and oriented ×3. Normal affect.    MSK and Neuro: -    Thoracic/Lumbar Spine/Sacral Spine/Hips   There are no signs of infection around the injection sites.   full  active range of motion with flexion, lateral flexion, and rotation bilaterally.   There is full  active range of motion with lumbar extension.    There is no  pain with lumbar extension.   There is no pain with facet loading maneuver (extension rotation) with axial low back pain on the BILATERAL side(s)       Palpation:   No tenderness to palpation in midline at T1-T12 levels. No tenderness to palpation in the left and right of the midline T1-L5, NEGATIVE for tenderness to palpation to the para-midline region in the lower lumbar levels.  palpation over SI joint: negative bilaterally    palpation in hip or over the gluteus medius tendon insertion: negative bilaterally      Lumbar spine Special tests  Neuro tension  Straight leg test negative bilaterally    Slump test negative bilaterally      Key points for the international standards for neurological classification of spinal cord injury (ISNCSCI) to light touch.     Dermatome R L                                      L2 2 2   L3 2 2   L4 2 2   L5 2 2   S1 2 2   S2 2 2         Motor Exam Lower Extremities    ? Myotome R L   Hip flexion L2 5 5   Knee extension L3 5 5   Ankle " "dorsiflexion L4 5 5   Toe extension L5 5 5   Ankle plantarflexion S1 5 5           MEDICAL DECISION MAKING    DATA    Labs:   Lab Results   Component Value Date/Time    SODIUM 140 2023 07:05 AM    POTASSIUM 4.2 2023 07:05 AM    CHLORIDE 102 2023 07:05 AM    CO2 27 2023 07:05 AM    GLUCOSE 89 2023 07:05 AM    BUN 14 2023 07:05 AM    CREATININE 0.77 2023 07:05 AM    BUNCREATRAT 13 2018 11:31 AM        No results found for: \"PROTHROMBTM\", \"INR\"     No results found for: \"WBC\", \"RBC\", \"HEMOGLOBIN\", \"HEMATOCRIT\", \"MCV\", \"MCH\", \"MCHC\", \"MPV\", \"NEUTSPOLYS\", \"LYMPHOCYTES\", \"MONOCYTES\", \"EOSINOPHILS\", \"BASOPHILS\", \"HYPOCHROMIA\", \"ANISOCYTOSIS\"     Lab Results   Component Value Date/Time    HBA1C 5.9 (H) 2023 07:05 AM          Imaging:   I personally reviewed following images        X-ray lumbar spine 2023  Transitional anatomy.  Facet arthropathy and degenerative changes.  Degenerative changes in the sacroiliac joints.    I reviewed the following radiology reports                                           Results for orders placed during the hospital encounter of 23    DX-LUMBAR SPINE-2 OR 3 VIEWS    Impression  1.  Transitional thoracolumbar and lumbosacral vertebrae considered T12 and L5 in this dictation.    2.  Mild degenerative changes.    3.  Sclerotic irregular SI joints. Erosive changes not excluded.                   Results for orders placed during the hospital encounter of 10/15/22    DX-WRIST-LIMITED 2- RIGHT    Impression  No evidence of fracture or dislocation.        DIAGNOSIS   Visit Diagnoses     ICD-10-CM   1. Sacroiliac joint dysfunction of right side  M53.3   2. Chronic right-sided low back pain without sciatica  M54.50    G89.29   3. Lumbar spondylosis  M47.816         ASSESSMENT and PLAN:     Shanae Blanca GONZALEZ 1970 female      Shanae was seen today for follow-up.    Diagnoses and all orders for this visit:    Sacroiliac " joint dysfunction of right side  -     Referral to Physical Therapy    Chronic right-sided low back pain without sciatica  -     Referral to Physical Therapy    Lumbar spondylosis  -     Referral to Physical Therapy        The patient has had an improvement in her pain and function with conservative treatments.  We discussed additions to the home exercise program.  We discussed the importance of core strengthening and stretching.    Medications: Continue meloxicam during flares of pain    Follow up: 6 months or sooner as needed    Thank you for allowing me to participate in the care of this patient. If you have any questions please not hesitate to contact me.             Please note that this dictation was created using voice recognition software. I have made every reasonable attempt to correct obvious errors but there may be errors of grammar and content that I may have overlooked prior to finalization of this note.      Mark Andre MD  Interventional Spine and Sports Physiatry  Physical Medicine and Rehabilitation  [facility]

## 2024-05-15 ENCOUNTER — APPOINTMENT (OUTPATIENT)
Dept: PHYSICAL MEDICINE AND REHAB | Facility: MEDICAL CENTER | Age: 54
End: 2024-05-15
Payer: COMMERCIAL

## 2024-07-04 ENCOUNTER — APPOINTMENT (OUTPATIENT)
Dept: TELEHEALTH | Facility: TELEMEDICINE | Age: 54
End: 2024-07-04
Payer: COMMERCIAL

## 2024-09-25 ENCOUNTER — OFFICE VISIT (OUTPATIENT)
Dept: MEDICAL GROUP | Facility: PHYSICIAN GROUP | Age: 54
End: 2024-09-25
Payer: COMMERCIAL

## 2024-09-25 VITALS
BODY MASS INDEX: 27.07 KG/M2 | TEMPERATURE: 98.4 F | HEART RATE: 84 BPM | HEIGHT: 63 IN | OXYGEN SATURATION: 96 % | SYSTOLIC BLOOD PRESSURE: 112 MMHG | DIASTOLIC BLOOD PRESSURE: 60 MMHG | WEIGHT: 152.8 LBS

## 2024-09-25 DIAGNOSIS — M25.512 ACUTE PAIN OF LEFT SHOULDER: ICD-10-CM

## 2024-09-25 PROCEDURE — 3074F SYST BP LT 130 MM HG: CPT | Performed by: FAMILY MEDICINE

## 2024-09-25 PROCEDURE — 3078F DIAST BP <80 MM HG: CPT | Performed by: FAMILY MEDICINE

## 2024-09-25 PROCEDURE — 99213 OFFICE O/P EST LOW 20 MIN: CPT | Performed by: FAMILY MEDICINE

## 2024-09-25 RX ORDER — TOPIRAMATE 25 MG/1
TABLET, FILM COATED ORAL
COMMUNITY
Start: 2024-09-19

## 2024-09-25 NOTE — PROGRESS NOTES
"Verbal consent was acquired by the patient to use [employer] ambient listening note generation during this visit     Subjective:     HPI:   History of Present Illness  The patient presents for evaluation of left shoulder pain.    She has been experiencing pain in her left shoulder for approximately 2.5 weeks. She believes this may be due to receiving the COVID-19 and influenza vaccines in the same arm a week prior to the onset of the pain. She also considers the possibility of an injury from her Pilates class or paddling activities. The pain is triggered when she flexes or rotates her shoulder internally or externally.     She reports no swelling but mentions occasional numbness and tingling extending down to her fingertips, primarily affecting her thumb. She has attempted to alleviate the pain with ice, topical Tylenol, CBD, and Icy Hot, but these measures have only provided temporary relief.     She has a physical therapy session scheduled for her back next Wednesday. She is not experiencing any chest pain, breathing difficulties, fevers, or chills in recent days.    Objective:     Exam:  /60 (BP Location: Left arm, Patient Position: Sitting, BP Cuff Size: Adult)   Pulse 84   Temp 36.9 °C (98.4 °F) (Temporal)   Ht 1.6 m (5' 3\")   Wt 69.3 kg (152 lb 12.8 oz)   LMP 03/17/2021 (Within Days)   SpO2 96%   BMI 27.07 kg/m²  Body mass index is 27.07 kg/m².    Physical Exam  Constitutional:       Appearance: Normal appearance.   Cardiovascular:      Rate and Rhythm: Normal rate and regular rhythm.      Heart sounds: Normal heart sounds.   Pulmonary:      Effort: Pulmonary effort is normal.      Breath sounds: Normal breath sounds.   Musculoskeletal:      Cervical back: Normal range of motion and neck supple.      Comments: L Shoulder: Full ROM, TTP not present, full strength, empty can test positive, drop arm test negative, Hawkin's negative, [place] positive   Lymphadenopathy:      Cervical: No cervical " adenopathy.   Neurological:      Mental Status: She is alert.             Results      Assessment & Plan:     1. Acute pain of left shoulder  Referral to Physical Therapy          Assessment & Plan  1. Left shoulder pain.  Acute. The delayed onset of her pain, occurring a week after vaccination, suggests it is unlikely to be vaccine-related. The symptoms point towards a probable rotator cuff injury due to overuse, possibly involving a supraspinatus strain, infraspinatus versus teres minor strain, and SLAP tear. A referral will be made to [facility] for further management. Should physical therapy prove ineffective, imaging will be considered and a specialist consultation will be arranged.      Referral for genetic research was offered. Patient is a participant.    Return if symptoms worsen or fail to improve.    Please note that this dictation was created using voice recognition software. I have made every reasonable attempt to correct obvious errors, but I expect that there are errors of grammar and possibly content that I did not discover before finalizing the note.

## 2024-11-07 ENCOUNTER — APPOINTMENT (OUTPATIENT)
Dept: PHYSICAL MEDICINE AND REHAB | Facility: MEDICAL CENTER | Age: 54
End: 2024-11-07
Payer: COMMERCIAL

## 2024-12-04 DIAGNOSIS — M54.41 CHRONIC BILATERAL LOW BACK PAIN WITH BILATERAL SCIATICA: ICD-10-CM

## 2024-12-04 DIAGNOSIS — G89.29 CHRONIC BILATERAL LOW BACK PAIN WITH BILATERAL SCIATICA: ICD-10-CM

## 2024-12-04 DIAGNOSIS — M54.42 CHRONIC BILATERAL LOW BACK PAIN WITH BILATERAL SCIATICA: ICD-10-CM

## 2024-12-04 RX ORDER — MELOXICAM 15 MG/1
15 TABLET ORAL DAILY
Qty: 30 TABLET | Refills: 0 | Status: SHIPPED | OUTPATIENT
Start: 2024-12-04

## 2024-12-04 NOTE — TELEPHONE ENCOUNTER
Received request via: Patient    Was the patient seen in the last year in this department? Yes    Does the patient have an active prescription (recently filled or refills available) for medication(s) requested? No    [facility]    Does the patient have half-way Plus and need 100-day supply? (This applies to ALL medications) Patient does not have SCP

## 2025-01-20 ENCOUNTER — HOSPITAL ENCOUNTER (OUTPATIENT)
Dept: RADIOLOGY | Facility: MEDICAL CENTER | Age: 55
End: 2025-01-20
Payer: COMMERCIAL

## 2025-01-20 DIAGNOSIS — Z12.31 VISIT FOR SCREENING MAMMOGRAM: ICD-10-CM

## 2025-01-20 PROCEDURE — 77067 SCR MAMMO BI INCL CAD: CPT

## 2025-01-21 ENCOUNTER — HOSPITAL ENCOUNTER (OUTPATIENT)
Dept: LAB | Facility: MEDICAL CENTER | Age: 55
End: 2025-01-21
Payer: COMMERCIAL

## 2025-01-21 LAB
ALBUMIN SERPL BCP-MCNC: 4.2 G/DL (ref 3.2–4.9)
ALBUMIN/GLOB SERPL: 1.3 G/DL
ALP SERPL-CCNC: 73 U/L (ref 30–99)
ALT SERPL-CCNC: 13 U/L (ref 2–50)
ANION GAP SERPL CALC-SCNC: 10 MMOL/L (ref 7–16)
AST SERPL-CCNC: 17 U/L (ref 12–45)
BILIRUB SERPL-MCNC: 0.3 MG/DL (ref 0.1–1.5)
BUN SERPL-MCNC: 15 MG/DL (ref 8–22)
CALCIUM ALBUM COR SERPL-MCNC: 8.9 MG/DL (ref 8.5–10.5)
CALCIUM SERPL-MCNC: 9.1 MG/DL (ref 8.5–10.5)
CHLORIDE SERPL-SCNC: 105 MMOL/L (ref 96–112)
CHOLEST SERPL-MCNC: 153 MG/DL (ref 100–199)
CO2 SERPL-SCNC: 26 MMOL/L (ref 20–33)
CREAT SERPL-MCNC: 0.86 MG/DL (ref 0.5–1.4)
ERYTHROCYTE [DISTWIDTH] IN BLOOD BY AUTOMATED COUNT: 44.1 FL (ref 35.9–50)
EST. AVERAGE GLUCOSE BLD GHB EST-MCNC: 120 MG/DL
GFR SERPLBLD CREATININE-BSD FMLA CKD-EPI: 81 ML/MIN/1.73 M 2
GLOBULIN SER CALC-MCNC: 3.2 G/DL (ref 1.9–3.5)
GLUCOSE SERPL-MCNC: 87 MG/DL (ref 65–99)
HBA1C MFR BLD: 5.8 % (ref 4–5.6)
HCT VFR BLD AUTO: 40.7 % (ref 37–47)
HDLC SERPL-MCNC: 57 MG/DL
HGB BLD-MCNC: 12.9 G/DL (ref 12–16)
LDLC SERPL CALC-MCNC: 85 MG/DL
MCH RBC QN AUTO: 28.5 PG (ref 27–33)
MCHC RBC AUTO-ENTMCNC: 31.7 G/DL (ref 32.2–35.5)
MCV RBC AUTO: 89.8 FL (ref 81.4–97.8)
PLATELET # BLD AUTO: 442 K/UL (ref 164–446)
PMV BLD AUTO: 10 FL (ref 9–12.9)
POTASSIUM SERPL-SCNC: 3.8 MMOL/L (ref 3.6–5.5)
PROT SERPL-MCNC: 7.4 G/DL (ref 6–8.2)
RBC # BLD AUTO: 4.53 M/UL (ref 4.2–5.4)
SODIUM SERPL-SCNC: 141 MMOL/L (ref 135–145)
TRIGL SERPL-MCNC: 55 MG/DL (ref 0–149)
TSH SERPL-ACNC: 1.26 UIU/ML (ref 0.35–5.5)
WBC # BLD AUTO: 7.6 K/UL (ref 4.8–10.8)

## 2025-01-21 PROCEDURE — 84443 ASSAY THYROID STIM HORMONE: CPT

## 2025-01-21 PROCEDURE — 85027 COMPLETE CBC AUTOMATED: CPT

## 2025-01-21 PROCEDURE — 80053 COMPREHEN METABOLIC PANEL: CPT

## 2025-01-21 PROCEDURE — 83525 ASSAY OF INSULIN: CPT

## 2025-01-21 PROCEDURE — 80061 LIPID PANEL: CPT

## 2025-01-21 PROCEDURE — 83036 HEMOGLOBIN GLYCOSYLATED A1C: CPT

## 2025-01-21 PROCEDURE — 36415 COLL VENOUS BLD VENIPUNCTURE: CPT

## 2025-01-22 LAB — INSULIN P FAST SERPL-ACNC: 4 UIU/ML (ref 3–25)
